# Patient Record
Sex: FEMALE | Employment: UNEMPLOYED | URBAN - METROPOLITAN AREA
[De-identification: names, ages, dates, MRNs, and addresses within clinical notes are randomized per-mention and may not be internally consistent; named-entity substitution may affect disease eponyms.]

---

## 2017-01-10 ENCOUNTER — ANESTHESIA EVENT (OUTPATIENT)
Dept: SURGERY | Age: 57
End: 2017-01-10
Payer: MEDICAID

## 2017-01-10 ENCOUNTER — HOSPITAL ENCOUNTER (OUTPATIENT)
Dept: LAB | Age: 57
Discharge: HOME | End: 2017-01-10
Payer: MEDICAID

## 2017-01-10 LAB
ANION GAP BLD CALC-SCNC: 5 MMOL/L (ref 3–18)
BUN SERPL-MCNC: 15 MG/DL (ref 7–18)
BUN/CREAT SERPL: 20 (ref 12–20)
CALCIUM SERPL-MCNC: 8.7 MG/DL (ref 8.5–10.1)
CHLORIDE SERPL-SCNC: 100 MMOL/L (ref 100–108)
CO2 SERPL-SCNC: 32 MMOL/L (ref 21–32)
CREAT SERPL-MCNC: 0.75 MG/DL (ref 0.6–1.3)
GLUCOSE SERPL-MCNC: 89 MG/DL (ref 74–99)
POTASSIUM SERPL-SCNC: 3.9 MMOL/L (ref 3.5–5.5)
SODIUM SERPL-SCNC: 137 MMOL/L (ref 136–145)

## 2017-01-10 PROCEDURE — 36415 COLL VENOUS BLD VENIPUNCTURE: CPT | Performed by: SURGERY

## 2017-01-10 PROCEDURE — 80048 BASIC METABOLIC PNL TOTAL CA: CPT | Performed by: SURGERY

## 2017-01-11 ENCOUNTER — ANESTHESIA (OUTPATIENT)
Dept: SURGERY | Age: 57
End: 2017-01-11
Payer: MEDICAID

## 2017-01-11 ENCOUNTER — HOME HEALTH ADMISSION (OUTPATIENT)
Dept: HOME HEALTH SERVICES | Facility: HOME HEALTH | Age: 57
End: 2017-01-11
Payer: MEDICAID

## 2017-01-11 PROCEDURE — 77030008683 HC TU ET CUF COVD -A: Performed by: ANESTHESIOLOGY

## 2017-01-11 PROCEDURE — 77030026438 HC STYL ET INTUB CARD -A: Performed by: ANESTHESIOLOGY

## 2017-01-11 PROCEDURE — 74011250636 HC RX REV CODE- 250/636

## 2017-01-11 PROCEDURE — 74011000250 HC RX REV CODE- 250

## 2017-01-11 PROCEDURE — 74011250636 HC RX REV CODE- 250/636: Performed by: SURGERY

## 2017-01-11 RX ORDER — SUCCINYLCHOLINE CHLORIDE 20 MG/ML
INJECTION INTRAMUSCULAR; INTRAVENOUS AS NEEDED
Status: DISCONTINUED | OUTPATIENT
Start: 2017-01-11 | End: 2017-01-11 | Stop reason: HOSPADM

## 2017-01-11 RX ORDER — PROPOFOL 10 MG/ML
INJECTION, EMULSION INTRAVENOUS AS NEEDED
Status: DISCONTINUED | OUTPATIENT
Start: 2017-01-11 | End: 2017-01-11 | Stop reason: HOSPADM

## 2017-01-11 RX ORDER — ONDANSETRON 2 MG/ML
INJECTION INTRAMUSCULAR; INTRAVENOUS AS NEEDED
Status: DISCONTINUED | OUTPATIENT
Start: 2017-01-11 | End: 2017-01-11 | Stop reason: HOSPADM

## 2017-01-11 RX ORDER — MIDAZOLAM HYDROCHLORIDE 1 MG/ML
INJECTION, SOLUTION INTRAMUSCULAR; INTRAVENOUS AS NEEDED
Status: DISCONTINUED | OUTPATIENT
Start: 2017-01-11 | End: 2017-01-11 | Stop reason: HOSPADM

## 2017-01-11 RX ORDER — FENTANYL CITRATE 50 UG/ML
INJECTION, SOLUTION INTRAMUSCULAR; INTRAVENOUS AS NEEDED
Status: DISCONTINUED | OUTPATIENT
Start: 2017-01-11 | End: 2017-01-11 | Stop reason: HOSPADM

## 2017-01-11 RX ORDER — LIDOCAINE HYDROCHLORIDE 20 MG/ML
INJECTION, SOLUTION EPIDURAL; INFILTRATION; INTRACAUDAL; PERINEURAL AS NEEDED
Status: DISCONTINUED | OUTPATIENT
Start: 2017-01-11 | End: 2017-01-11 | Stop reason: HOSPADM

## 2017-01-11 RX ADMIN — SUCCINYLCHOLINE CHLORIDE 140 MG: 20 INJECTION INTRAMUSCULAR; INTRAVENOUS at 07:45

## 2017-01-11 RX ADMIN — LIDOCAINE HYDROCHLORIDE 80 MG: 20 INJECTION, SOLUTION EPIDURAL; INFILTRATION; INTRACAUDAL; PERINEURAL at 07:45

## 2017-01-11 RX ADMIN — FENTANYL CITRATE 50 MCG: 50 INJECTION, SOLUTION INTRAMUSCULAR; INTRAVENOUS at 10:26

## 2017-01-11 RX ADMIN — MIDAZOLAM HYDROCHLORIDE 2 MG: 1 INJECTION, SOLUTION INTRAMUSCULAR; INTRAVENOUS at 07:39

## 2017-01-11 RX ADMIN — FENTANYL CITRATE 50 MCG: 50 INJECTION, SOLUTION INTRAMUSCULAR; INTRAVENOUS at 08:26

## 2017-01-11 RX ADMIN — PROPOFOL 40 MG: 10 INJECTION, EMULSION INTRAVENOUS at 08:11

## 2017-01-11 RX ADMIN — FENTANYL CITRATE 50 MCG: 50 INJECTION, SOLUTION INTRAMUSCULAR; INTRAVENOUS at 08:11

## 2017-01-11 RX ADMIN — PROPOFOL 160 MG: 10 INJECTION, EMULSION INTRAVENOUS at 07:45

## 2017-01-11 RX ADMIN — FENTANYL CITRATE 50 MCG: 50 INJECTION, SOLUTION INTRAMUSCULAR; INTRAVENOUS at 10:13

## 2017-01-11 RX ADMIN — FENTANYL CITRATE 100 MCG: 50 INJECTION, SOLUTION INTRAMUSCULAR; INTRAVENOUS at 07:41

## 2017-01-11 RX ADMIN — ONDANSETRON 4 MG: 2 INJECTION INTRAMUSCULAR; INTRAVENOUS at 07:40

## 2017-01-11 RX ADMIN — CEFAZOLIN SODIUM 2 G: 2 SOLUTION INTRAVENOUS at 07:41

## 2017-01-11 NOTE — ANESTHESIA PREPROCEDURE EVALUATION
Anesthetic History   No history of anesthetic complications            Review of Systems / Medical History  Patient summary reviewed and pertinent labs reviewed    Pulmonary            Asthma : well controlled       Neuro/Psych   Within defined limits           Cardiovascular    Hypertension: well controlled              Exercise tolerance: >4 METS  Comments: H/o ASD repair as a child   GI/Hepatic/Renal     GERD: well controlled           Endo/Other    Diabetes: type 2  Hypothyroidismhyperthyroidism  Obesity     Other Findings   Comments: Non smoker  Had flu shot           Physical Exam    Airway  Mallampati: III  TM Distance: 4 - 6 cm  Neck ROM: normal range of motion   Mouth opening: Normal     Cardiovascular  Regular rate and rhythm,  S1 and S2 normal,  no murmur, click, rub, or gallop             Dental  No notable dental hx       Pulmonary  Breath sounds clear to auscultation               Abdominal  GI exam deferred       Other Findings            Anesthetic Plan    ASA: 3  Anesthesia type: general      Post-op pain plan if not by surgeon: IV PCA    Induction: Intravenous  Anesthetic plan and risks discussed with: Patient

## 2017-01-11 NOTE — ANESTHESIA POSTPROCEDURE EVALUATION
Post-Anesthesia Evaluation and Assessment    Patient: Santana Diamond MRN: [id number]  SSN: [id number]    YOB: 1960  Age: 61 y.o. Sex: female       Cardiovascular Function/Vital Signs  Visit Vitals    /89 (BP 1 Location: Right arm, BP Patient Position: At rest)    Pulse 89    Temp 36.6 °C (97.8 °F)    Resp 20    Ht 5' 3\" (1.6 m)    Wt 77.6 kg (171 lb)    SpO2 100%    BMI 30.29 kg/m2       Patient is status post general anesthesia for Procedure(s):  PANNICULECTOMY. Nausea/Vomiting: None    Postoperative hydration reviewed and adequate. Pain:  Pain Scale 1: Numeric (0 - 10) (01/11/17 1343)  Pain Intensity 1: 0 (01/11/17 1343)   Managed    Neurological Status:   Neuro (WDL): Within Defined Limits (01/11/17 0658)   At baseline    Mental Status and Level of Consciousness: Arousable    Pulmonary Status:   O2 Device: Room air (01/11/17 1343)   Adequate oxygenation and airway patent    Complications related to anesthesia: None    Post-anesthesia assessment completed.  No concerns        Signed By: Kamar Paulson MD     January 11, 2017

## 2017-01-12 ENCOUNTER — HOME CARE VISIT (OUTPATIENT)
Dept: SCHEDULING | Facility: HOME HEALTH | Age: 57
End: 2017-01-12
Payer: MEDICAID

## 2017-01-12 VITALS
RESPIRATION RATE: 18 BRPM | HEART RATE: 69 BPM | OXYGEN SATURATION: 98 % | DIASTOLIC BLOOD PRESSURE: 86 MMHG | TEMPERATURE: 98.6 F | SYSTOLIC BLOOD PRESSURE: 150 MMHG

## 2017-01-12 PROCEDURE — A4452 WATERPROOF TAPE: HCPCS

## 2017-01-12 PROCEDURE — G0299 HHS/HOSPICE OF RN EA 15 MIN: HCPCS

## 2017-01-12 PROCEDURE — A6253 ABSORPT DRG > 48 SQ IN W/O B: HCPCS

## 2017-01-12 PROCEDURE — 400013 HH SOC

## 2017-01-12 PROCEDURE — A6252 ABSORPT DRG >16 <=48 W/O BDR: HCPCS

## 2017-01-13 ENCOUNTER — HOME CARE VISIT (OUTPATIENT)
Dept: SCHEDULING | Facility: HOME HEALTH | Age: 57
End: 2017-01-13
Payer: MEDICAID

## 2017-01-13 VITALS
SYSTOLIC BLOOD PRESSURE: 108 MMHG | OXYGEN SATURATION: 98 % | HEART RATE: 74 BPM | RESPIRATION RATE: 18 BRPM | DIASTOLIC BLOOD PRESSURE: 64 MMHG | TEMPERATURE: 98.6 F

## 2017-01-13 PROCEDURE — G0299 HHS/HOSPICE OF RN EA 15 MIN: HCPCS

## 2017-01-17 ENCOUNTER — HOME CARE VISIT (OUTPATIENT)
Dept: SCHEDULING | Facility: HOME HEALTH | Age: 57
End: 2017-01-17
Payer: MEDICAID

## 2017-01-17 VITALS
SYSTOLIC BLOOD PRESSURE: 110 MMHG | RESPIRATION RATE: 18 BRPM | DIASTOLIC BLOOD PRESSURE: 58 MMHG | TEMPERATURE: 98.2 F | OXYGEN SATURATION: 99 % | HEART RATE: 79 BPM

## 2017-01-17 PROCEDURE — G0299 HHS/HOSPICE OF RN EA 15 MIN: HCPCS

## 2017-01-20 ENCOUNTER — HOME CARE VISIT (OUTPATIENT)
Dept: SCHEDULING | Facility: HOME HEALTH | Age: 57
End: 2017-01-20
Payer: MEDICAID

## 2017-01-20 VITALS
TEMPERATURE: 98.2 F | HEART RATE: 71 BPM | OXYGEN SATURATION: 97 % | SYSTOLIC BLOOD PRESSURE: 130 MMHG | DIASTOLIC BLOOD PRESSURE: 72 MMHG | RESPIRATION RATE: 18 BRPM

## 2017-01-20 PROCEDURE — G0299 HHS/HOSPICE OF RN EA 15 MIN: HCPCS

## 2017-01-24 ENCOUNTER — HOME CARE VISIT (OUTPATIENT)
Dept: HOME HEALTH SERVICES | Facility: HOME HEALTH | Age: 57
End: 2017-01-24
Payer: MEDICAID

## 2017-01-25 ENCOUNTER — HOME CARE VISIT (OUTPATIENT)
Dept: SCHEDULING | Facility: HOME HEALTH | Age: 57
End: 2017-01-25
Payer: MEDICAID

## 2017-01-25 VITALS
SYSTOLIC BLOOD PRESSURE: 108 MMHG | HEART RATE: 66 BPM | DIASTOLIC BLOOD PRESSURE: 66 MMHG | RESPIRATION RATE: 18 BRPM | TEMPERATURE: 98.2 F | OXYGEN SATURATION: 98 %

## 2017-01-25 PROCEDURE — G0299 HHS/HOSPICE OF RN EA 15 MIN: HCPCS

## 2018-03-15 ENCOUNTER — OFFICE VISIT (OUTPATIENT)
Dept: FAMILY MEDICINE CLINIC | Age: 58
End: 2018-03-15

## 2018-03-15 VITALS
SYSTOLIC BLOOD PRESSURE: 132 MMHG | HEART RATE: 63 BPM | OXYGEN SATURATION: 100 % | TEMPERATURE: 98 F | RESPIRATION RATE: 14 BRPM | DIASTOLIC BLOOD PRESSURE: 74 MMHG | BODY MASS INDEX: 27.85 KG/M2 | HEIGHT: 63 IN | WEIGHT: 157.2 LBS

## 2018-03-15 DIAGNOSIS — M25.551 HIP PAIN, BILATERAL: ICD-10-CM

## 2018-03-15 DIAGNOSIS — J45.20 MILD INTERMITTENT ASTHMA, UNSPECIFIED WHETHER COMPLICATED: ICD-10-CM

## 2018-03-15 DIAGNOSIS — E03.9 HYPOTHYROIDISM, UNSPECIFIED TYPE: Chronic | ICD-10-CM

## 2018-03-15 DIAGNOSIS — M25.552 HIP PAIN, BILATERAL: ICD-10-CM

## 2018-03-15 DIAGNOSIS — I10 ESSENTIAL HYPERTENSION: Primary | ICD-10-CM

## 2018-03-15 DIAGNOSIS — Z98.84 S/P BARIATRIC SURGERY: ICD-10-CM

## 2018-03-15 DIAGNOSIS — E66.01 MORBID OBESITY (HCC): ICD-10-CM

## 2018-03-15 DIAGNOSIS — K59.00 CONSTIPATION, UNSPECIFIED CONSTIPATION TYPE: ICD-10-CM

## 2018-03-15 RX ORDER — FLUTICASONE PROPIONATE AND SALMETEROL 250; 50 UG/1; UG/1
1 POWDER RESPIRATORY (INHALATION) DAILY
COMMUNITY
Start: 2018-01-19 | End: 2018-03-15 | Stop reason: ALTCHOICE

## 2018-03-15 RX ORDER — ALBUTEROL SULFATE 90 UG/1
2 AEROSOL, METERED RESPIRATORY (INHALATION)
Qty: 1 INHALER | Refills: 1 | Status: SHIPPED | OUTPATIENT
Start: 2018-03-15 | End: 2018-05-03 | Stop reason: SDUPTHER

## 2018-03-15 RX ORDER — POLYETHYLENE GLYCOL 3350 17 G/17G
17 POWDER, FOR SOLUTION ORAL AS NEEDED
COMMUNITY
End: 2018-03-15

## 2018-03-15 RX ORDER — LISINOPRIL 40 MG/1
40 TABLET ORAL DAILY
Qty: 90 TAB | Refills: 1 | Status: SHIPPED | OUTPATIENT
Start: 2018-03-15 | End: 2018-06-15 | Stop reason: SDUPTHER

## 2018-03-15 RX ORDER — LEVOTHYROXINE SODIUM 25 UG/1
25 TABLET ORAL
Qty: 90 TAB | Refills: 0 | Status: SHIPPED | OUTPATIENT
Start: 2018-03-15 | End: 2018-06-15 | Stop reason: SDUPTHER

## 2018-03-15 RX ORDER — ALBUTEROL SULFATE 90 UG/1
2 AEROSOL, METERED RESPIRATORY (INHALATION) DAILY
COMMUNITY
Start: 2018-01-05 | End: 2018-03-15 | Stop reason: SDUPTHER

## 2018-03-15 RX ORDER — DICLOFENAC SODIUM 10 MG/G
GEL TOPICAL
COMMUNITY
Start: 2018-02-28 | End: 2018-03-15 | Stop reason: ALTCHOICE

## 2018-03-15 RX ORDER — MONTELUKAST SODIUM 10 MG/1
10 TABLET ORAL DAILY
Qty: 90 TAB | Refills: 0 | Status: SHIPPED | OUTPATIENT
Start: 2018-03-15 | End: 2018-06-15 | Stop reason: SDUPTHER

## 2018-03-15 NOTE — MR AVS SNAPSHOT
[address] [place] [address] 
[address] Christal Armendariz [address] 
[phone] Patient: Lupe Condon MRN: [id number] AUS:[birth year] Visit Information Date & Time Provider Department Dept. Phone Encounter #  
 3/15/2018 11:20 AM Amelie Jacobson NP [facility] [phone] [id number] Follow-up Instructions Return in about 3 months (around 6/15/2018) for Medication Check, Weight Mgmt, Hypertension, Hypothyroidism. Upcoming Health Maintenance Date Due Hepatitis C Screening 6/14/2018* Pneumococcal 19-64 Medium Risk (1 of 1 - PPSV23) 7/12/2018* BREAST CANCER SCRN MAMMOGRAM 7/13/2018* PAP AKA CERVICAL CYTOLOGY 7/18/2018* DTaP/Tdap/Td series (1 - Tdap) 7/26/2018* COLONOSCOPY 8/8/2018* *Topic was postponed. The date shown is not the original due date. Allergies as of 3/15/2018  Review Complete On: 3/15/2018 By: Amelie Jacobson NP Severity Noted Reaction Type Reactions Daypro [Oxaprozin]  05/07/2012    Swelling Current Immunizations  Never Reviewed No immunizations on file. Not reviewed this visit You Were Diagnosed With   
  
 Codes Comments Essential hypertension    -  Primary ICD-10-CM: I10 
ICD-9-CM: 401.9 Morbid obesity ([facility] [address]     ICD-10-CM: E66.01 
ICD-9-CM: 278.01 S/P bariatric surgery     ICD-10-CM: Z98.84 ICD-9-CM: V45.86 Hypothyroidism, unspecified type     ICD-10-CM: E03.9 ICD-9-CM: 244.9 Mild intermittent asthma, unspecified whether complicated     PYF-04-CC: J45.20 ICD-9-CM: 493.90 Constipation, unspecified constipation type     ICD-10-CM: K59.00 ICD-9-CM: 564.00 Hip pain, bilateral     ICD-10-CM: M25.551, M25.552 ICD-9-CM: 719.45 Vitals BP Pulse Temp Resp Height(growth percentile) Weight(growth percentile) 132/74 (BP 1 Location: Right arm, BP Patient Position: Sitting) 63 98 °F (36.7 °C) (Oral) 14 5' 3\" (1.6 m) 157 lb 3.2 oz (71.3 kg) SpO2 BMI OB Status Smoking Status 100% 27.85 kg/m2 Postmenopausal Never Smoker Vitals History BMI and BSA Data Body Mass Index Body Surface Area  
 27.85 kg/m 2 1.78 m 2 Preferred Pharmacy Pharmacy Name Phone [address] [phone] Your Updated Medication List  
  
   
This list is accurate as of 3/15/18  1:07 PM.  Always use your most recent med list.  
  
  
  
  
 acetaminophen 500 mg tablet Commonly known as:  TYLENOL Take 500 mg by mouth as needed for Pain. albuterol 90 mcg/actuation inhaler Commonly known as:  VENTOLIN HFA Take 2 Puffs by inhalation every four (4) hours as needed for Wheezing. amitriptyline 50 mg tablet Commonly known as:  ELAVIL Take 100 mg by mouth nightly. CALCIUM CITRATE PO Take 1 Tab by mouth two (2) times a day. levothyroxine 25 mcg tablet Commonly known as:  SYNTHROID Take 1 Tab by mouth Daily (before breakfast). linaclotide 145 mcg Cap capsule Commonly known as:  Rudy Mariano Take 1 Cap by mouth daily. lisinopril 40 mg tablet Commonly known as:  Carol Ann Doan Take 1 Tab by mouth daily. loratadine 10 mg tablet Commonly known as:  Sukarina Millers Take 10 mg by mouth daily. magnesium oxide 400 mg tablet Commonly known as:  MAG-OX Take 400 mg by mouth two (2) times a day. montelukast 10 mg tablet Commonly known as:  SINGULAIR Take 1 Tab by mouth daily. [address] Take 2 Gum by mouth two (2) times a day. Prescriptions Sent to Pharmacy Refills  
 albuterol (VENTOLIN HFA) 90 mcg/actuation inhaler 1 Sig: Take 2 Puffs by inhalation every four (4) hours as needed for Wheezing. Class: Normal  
 [facility] [address] [facility] [address] Ph #: [phone]  Route: Inhalation  
 levothyroxine (SYNTHROID) 25 mcg tablet 0  
 Sig: Take 1 Tab by mouth Daily (before breakfast). Class: Normal  
 Pharmacy: [address] Ph #: [phone] Route: Oral  
 lisinopril (PRINIVIL, ZESTRIL) 40 mg tablet 1 Sig: Take 1 Tab by mouth daily. Class: Normal  
 Pharmacy: [address] Ph #: [phone] Route: Oral  
 montelukast (SINGULAIR) 10 mg tablet 0 Sig: Take 1 Tab by mouth daily. Class: Normal  
 Pharmacy: [address] Ph #: [phone] Route: Oral  
 linaclotide (LINZESS) 145 mcg cap capsule 0 Sig: Take 1 Cap by mouth daily. Class: Normal  
 Pharmacy: [address] Ph #: [phone] Route: Oral  
  
Follow-up Instructions Return in about 3 months (around 6/15/2018) for Medication Check, Weight Mgmt, Hypertension, Hypothyroidism. Patient Instructions To have your blood type checked, you can: 
1) donate blood to a group like the [employer] or [employer] 2) Go to [employer] and pay out of pocket. You would have to come to our office,  the paper order for the blood type, take ID and order to [facility], pay at the time your blood is drawn. It is estimated to be $87.00 Constipation: Care Instructions Your Care Instructions Constipation means that you have a hard time passing stools (bowel movements). People pass stools from 3 times a day to once every 3 days. What is normal for you may be different. Constipation may occur with pain in the rectum and cramping. The pain may get worse when you try to pass stools. Sometimes there are small amounts of bright red blood on toilet paper or the surface of stools. This is because of enlarged veins near the rectum (hemorrhoids).  
A few changes in your diet and lifestyle may help you avoid ongoing constipation. Your doctor may also prescribe medicine to help loosen your stool. Some medicines can cause constipation. These include pain medicines and antidepressants. Tell your doctor about all the medicines you take. Your doctor may want to make a medicine change to ease your symptoms. Follow-up care is a key part of your treatment and safety. Be sure to make and go to all appointments, and call your doctor if you are having problems. It's also a good idea to know your test results and keep a list of the medicines you take. How can you care for yourself at home? · Drink plenty of fluids, enough so that your urine is light yellow or clear like water. If you have kidney, heart, or liver disease and have to limit fluids, talk with your doctor before you increase the amount of fluids you drink. · Include high-fiber foods in your diet each day. These include fruits, vegetables, beans, and whole grains. · Get at least 30 minutes of exercise on most days of the week. Walking is a good choice. You also may want to do other activities, such as running, swimming, cycling, or playing tennis or team sports. · Take a fiber supplement, such as Citrucel or Metamucil, every day. Read and follow all instructions on the label. · Schedule time each day for a bowel movement. A daily routine may help. Take your time having your bowel movement. · Support your feet with a small step stool when you sit on the toilet. This helps flex your hips and places your pelvis in a squatting position. · Your doctor may recommend an over-the-counter laxative to relieve your constipation. Examples are Milk of Magnesia and MiraLax. Read and follow all instructions on the label. Do not use laxatives on a long-term basis. When should you call for help? Call your doctor now or seek immediate medical care if: 
? · You have new or worse belly pain. ? · You have new or worse nausea or vomiting. ? · You have blood in your stools. ?Watch closely for changes in your health, and be sure to contact your doctor if: 
? · Your constipation is getting worse. ? · You do not get better as expected. Where can you learn more? Go to http://federico-suah.info/. Enter 21 [phone] in the search box to learn more about \"Constipation: Care Instructions. \" Current as of: March 20, 2017 Content Version: 11.4 © 0149-4542 [employer]. Care instructions adapted under license by [employer] (which disclaims liability or warranty for this information). If you have questions about a medical condition or this instruction, always ask your healthcare professional. [place] [address] any warranty or liability for your use of this information. Hip Pain: Care Instructions Your Care Instructions Hip pain may be caused by many things, including overuse, a fall, or a twisting movement. Another cause of hip pain is arthritis. Your pain may increase when you stand up, walk, or squat. The pain may come and go or may be constant. Home treatment can help relieve hip pain, swelling, and stiffness. If your pain is ongoing, you may need more tests and treatment. Follow-up care is a key part of your treatment and safety. Be sure to make and go to all appointments, and call your doctor if you are having problems. It's also a good idea to know your test results and keep a list of the medicines you take. How can you care for yourself at home? · Take pain medicines exactly as directed. ¨ If the doctor gave you a prescription medicine for pain, take it as prescribed. ¨ If you are not taking a prescription pain medicine, ask your doctor if you can take an over-the-counter medicine. · Rest and protect your hip. Take a break from any activity, including standing or walking, that may cause pain. · Put ice or a cold pack against your hip for 10 to 20 minutes at a time. Try to do this every 1 to 2 hours for the next 3 days (when you are awake) or until the swelling goes down. Put a thin cloth between the ice and your skin. · Sleep on your healthy side with a pillow between your knees, or sleep on your back with pillows under your knees. · If there is no swelling, you can put moist heat, a heating pad, or a warm cloth on your hip. Do gentle stretching exercises to help keep your hip flexible. · Learn how to prevent falls. Have your vision and hearing checked regularly. Wear slippers or shoes with a nonskid sole. · Stay at a healthy weight. · Wear comfortable shoes. When should you call for help? Call 911 anytime you think you may need emergency care. For example, call if: 
? · You have sudden chest pain and shortness of breath, or you cough up blood. ? · You are not able to stand or walk or bear weight. ? · Your buttocks, legs, or feet feel numb or tingly. ? · Your leg or foot is cool or pale or changes color. ? · You have severe pain. ?Call your doctor now or seek immediate medical care if: 
? · You have signs of infection, such as: 
¨ Increased pain, swelling, warmth, or redness in the hip area. ¨ Red streaks leading from the hip area. ¨ Pus draining from the hip area. ¨ A fever. ? · You have signs of a blood clot, such as: 
¨ Pain in your calf, back of the knee, thigh, or groin. ¨ Redness and swelling in your leg or groin. ? · You are not able to bend, straighten, or move your leg normally. ? · You have trouble urinating or having bowel movements. ? Watch closely for changes in your health, and be sure to contact your doctor if: 
? · You do not get better as expected. Where can you learn more? Go to http://federico-suha.info/. Enter N868 in the search box to learn more about \"Hip Pain: Care Instructions. \" Current as of: March 20, 2017 Content Version: 11.4 © 7723-8220 [employer].  Care instructions adapted under license by [address] (which disclaims liability or warranty for this information). If you have questions about a medical condition or this instruction, always ask your healthcare professional. [address] any warranty or liability for your use of this information. Introducing [facility] & HEALTH SERVICES! [facility] introduces DeRev patient portal. Now you can access parts of your medical record, email your doctor's office, and request medication refills online. 1. In your internet browser, go to [email or web address] 2. Click on the First Time User? Click Here link in the Sign In box. You will see the New Member Sign Up page. 3. Enter your DeRev Access Code exactly as it appears below. You will not need to use this code after youve completed the sign-up process. If you do not sign up before the expiration date, you must request a new code. · DeRev Access Code: [email or web address] Expires: 6/13/2018 10:56 AM 
 
4. Enter the last four digits of your Social Security Number (xxxx) and Date of Birth (mm/dd/yyyy) as indicated and click Submit. You will be taken to the next sign-up page. 5. Create a DeRev ID. This will be your DeRev login ID and cannot be changed, so think of one that is secure and easy to remember. 6. Create a DeRev password. You can change your password at any time. 7. Enter your Password Reset Question and Answer. This can be used at a later time if you forget your password. 8. Enter your e-mail address. You will receive e-mail notification when new information is available in [address] 9. Click Sign Up. You can now view and download portions of your medical record. 10. Click the Download Summary menu link to download a portable copy of your medical information. If you have questions, please visit the Frequently Asked Questions section of the DeRev website.  Remember, DeRev is NOT to be used for urgent needs. For medical emergencies, dial 911. Now available from your iPhone and Android! Please provide this summary of care documentation to your next provider. Your primary care clinician is listed as Tamanna Dawson. If you have any questions after today's visit, please call [phone].

## 2018-03-15 NOTE — PROGRESS NOTES
Jarrod Parsons is a 60 y.o. female      Chief Complaint   Patient presents with    New Patient     Establish Care    Hip Pain     Pt stated that she would like to talk about getting steroid shots. 1. Have you been to the ER, urgent care clinic since your last visit? Hospitalized since your last visit? No    2. Have you seen or consulted any other health care providers outside of the [address] [place] [address] since your last visit? Include any pap smears or colon screening.  No

## 2018-03-15 NOTE — PATIENT INSTRUCTIONS
To have your blood type checked, you can:  1) donate blood to a group like the [employer] or [employer] [facility]    2) Go to [facility] and pay out of pocket. You would have to come to our office,  the paper order for the blood type, take ID and order to [facility], pay at the time your blood is drawn. It is estimated to be $87.00     Constipation: Care Instructions  Your Care Instructions    Constipation means that you have a hard time passing stools (bowel movements). People pass stools from 3 times a day to once every 3 days. What is normal for you may be different. Constipation may occur with pain in the rectum and cramping. The pain may get worse when you try to pass stools. Sometimes there are small amounts of bright red blood on toilet paper or the surface of stools. This is because of enlarged veins near the rectum (hemorrhoids). A few changes in your diet and lifestyle may help you avoid ongoing constipation. Your doctor may also prescribe medicine to help loosen your stool. Some medicines can cause constipation. These include pain medicines and antidepressants. Tell your doctor about all the medicines you take. Your doctor may want to make a medicine change to ease your symptoms. Follow-up care is a key part of your treatment and safety. Be sure to make and go to all appointments, and call your doctor if you are having problems. It's also a good idea to know your test results and keep a list of the medicines you take. How can you care for yourself at home? · Drink plenty of fluids, enough so that your urine is light yellow or clear like water. If you have kidney, heart, or liver disease and have to limit fluids, talk with your doctor before you increase the amount of fluids you drink. · Include high-fiber foods in your diet each day. These include fruits, vegetables, beans, and whole grains. · Get at least 30 minutes of exercise on most days of the week. Walking is a good choice.  You also may want to do other activities, such as running, swimming, cycling, or playing tennis or team sports. · Take a fiber supplement, such as Citrucel or Metamucil, every day. Read and follow all instructions on the label. · Schedule time each day for a bowel movement. A daily routine may help. Take your time having your bowel movement. · Support your feet with a small step stool when you sit on the toilet. This helps flex your hips and places your pelvis in a squatting position. · Your doctor may recommend an over-the-counter laxative to relieve your constipation. Examples are Milk of Magnesia and MiraLax. Read and follow all instructions on the label. Do not use laxatives on a long-term basis. When should you call for help? Call your doctor now or seek immediate medical care if:  ? · You have new or worse belly pain. ? · You have new or worse nausea or vomiting. ? · You have blood in your stools. ? Watch closely for changes in your health, and be sure to contact your doctor if:  ? · Your constipation is getting worse. ? · You do not get better as expected. Where can you learn more? Go to http://federico-suha.info/. Enter 21  in the search box to learn more about \"Constipation: Care Instructions. \"  Current as of: March 20, 2017  Content Version: 11.4  © 8368-2903 [employer]. Care instructions adapted under license by [employer] (which disclaims liability or warranty for this information). If you have questions about a medical condition or this instruction, always ask your healthcare professional. [place] [address] any warranty or liability for your use of this information. Hip Pain: Care Instructions  Your Care Instructions    Hip pain may be caused by many things, including overuse, a fall, or a twisting movement. Another cause of hip pain is arthritis. Your pain may increase when you stand up, walk, or squat.  The pain may come and go or may be constant. Home treatment can help relieve hip pain, swelling, and stiffness. If your pain is ongoing, you may need more tests and treatment. Follow-up care is a key part of your treatment and safety. Be sure to make and go to all appointments, and call your doctor if you are having problems. It's also a good idea to know your test results and keep a list of the medicines you take. How can you care for yourself at home? · Take pain medicines exactly as directed. ¨ If the doctor gave you a prescription medicine for pain, take it as prescribed. ¨ If you are not taking a prescription pain medicine, ask your doctor if you can take an over-the-counter medicine. · Rest and protect your hip. Take a break from any activity, including standing or walking, that may cause pain. · Put ice or a cold pack against your hip for 10 to 20 minutes at a time. Try to do this every 1 to 2 hours for the next 3 days (when you are awake) or until the swelling goes down. Put a thin cloth between the ice and your skin. · Sleep on your healthy side with a pillow between your knees, or sleep on your back with pillows under your knees. · If there is no swelling, you can put moist heat, a heating pad, or a warm cloth on your hip. Do gentle stretching exercises to help keep your hip flexible. · Learn how to prevent falls. Have your vision and hearing checked regularly. Wear slippers or shoes with a nonskid sole. · Stay at a healthy weight. · Wear comfortable shoes. When should you call for help? Call 911 anytime you think you may need emergency care. For example, call if:  ? · You have sudden chest pain and shortness of breath, or you cough up blood. ? · You are not able to stand or walk or bear weight. ? · Your buttocks, legs, or feet feel numb or tingly. ? · Your leg or foot is cool or pale or changes color. ? · You have severe pain.    ?Call your doctor now or seek immediate medical care if:  ? · You have signs of infection, such as:  ¨ Increased pain, swelling, warmth, or redness in the hip area. ¨ Red streaks leading from the hip area. ¨ Pus draining from the hip area. ¨ A fever. ? · You have signs of a blood clot, such as:  ¨ Pain in your calf, back of the knee, thigh, or groin. ¨ Redness and swelling in your leg or groin. ? · You are not able to bend, straighten, or move your leg normally. ? · You have trouble urinating or having bowel movements. ? Watch closely for changes in your health, and be sure to contact your doctor if:  ? · You do not get better as expected. Where can you learn more? Go to http://federico-suha.info/. Enter N168 in the search box to learn more about \"Hip Pain: Care Instructions. \"  Current as of: March 20, 2017  Content Version: 11.4  © 8956-6156 [employer]. Care instructions adapted under license by [employer] (which disclaims liability or warranty for this information). If you have questions about a medical condition or this instruction, always ask your healthcare professional. [place] [address] any warranty or liability for your use of this information.

## 2018-03-15 NOTE — PROGRESS NOTES
Chief Complaint   Patient presents with    New Patient     Establish Care    Hip Pain     Pt stated that she would like to talk about getting steroid shots. HPI:  Mitchel Callahan is a 60y.o. year old female who is a new patient and is here to establish care. She was previously followed by Dr. Jasper Rey until she left the practice, [facility]. She is also a former patient of mine at [facility]. Agree with nurse note. Hip Pain: Pt reports she is scheduled for rotator cuff repair on April 3, 2018. She has bilateral hip pain and used to get steroid injections in her hips w/ Dr. Eder Watson.  Today she reports her hip pain bilaterally is about a 5/10 which is much better. She would like to have steroid injections prior to her surgery. Weight Mgmt:  She is s/p laparoscopic GBP in 2014 with Dr. Fernandez Gong. Pt is here today in f/u for medication management for weight loss. Her preoperative weight was 289 pounds. Her HW was 313 pounds. The patient's goal weight is 140 pounds. Her weight today is 157 pounds with a BMI of 27.85. She is unsure how much weight she has lost since she saw me last in summer of 2017 at [facility]. She used to be on Contrave for approximately 3 months. She denies adverse effects, she felt like it was somewhat effective. Diet:  The patient has made the following diet modifications: she is doing very well with her diet protocol. Pt is tolerating solids well and is getting in adequate protein. Pt is measuring meals with \"eyeballing\". Hydration: Pt is getting in adequate fluids. Pt is not drinking with meals. Pt is not drinking liquid calories. The patient is drinking the following: water, The patient is getting in at least 64+ oz of fluids daily. Exercise: Pt is using the treadmill, walking/jogging at least 3x/week         Weights: The patient is weighing daily         Vitamins: Pt is taking the recommended postoperative nutritional supplements. Associated Symptoms: Pt denies N/V, GERD, night time regurgitation and episodes of obstruction. Pt denies abdominal pain. Pt c/o constipation despite taking Miralax. Pt denies diarrhea. The following sections were reviewed & updated as appropriate: PMH, PSH, PL, FMH,  and SH. 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    HX CHOLECYSTECTOMY  2007    HX CYST INCISION AND DRAINAGE  2007    HX GASTRIC BYPASS  2014    [facility]    C/S    HX ORTHOPAEDIC  2011    carpal tunnel right hand, with trigger finger release    MT CLOSED RX TOE FX  1984    pins placed       Patient Active Problem List   Diagnosis Code    HTN (hypertension) I10    Asthma J45.909    S/P bariatric surgery Z98.84    Hypothyroid E03.9    Hip pain, bilateral M25.551, M25.552    Constipation K59.00        Family History   Problem Relation Age of Onset    Diabetes Father     Asthma Father     Hypertension Father     Hypertension Sister     Hypertension Brother        Social History     Social History    Marital status: UNKNOWN     Spouse name: N/A    Number of children: N/A    Years of education: N/A     Occupational History    Not on file. Social History Main Topics    Smoking status: Never Smoker    Smokeless tobacco: Never Used    Alcohol use No    Drug use: No    Sexual activity: Not on file     Other Topics Concern    Not on file     Social History Narrative       Prior to Admission medications    Medication Sig Start Date End Date Taking?  Authorizing Provider   albuterol (VENTOLIN HFA) 90 mcg/actuation inhaler Take 2 Puffs by inhalation every four (4) hours as needed for Wheezing. 3/15/18  Yes Edwin Celestin NP   levothyroxine (SYNTHROID) 25 mcg tablet Take 1 Tab by mouth Daily (before breakfast). 3/15/18  Yes Edwin Celestin NP   lisinopril (PRINIVIL, ZESTRIL) 40 mg tablet Take 1 Tab by mouth daily. 3/15/18  Yes Edwin Celestin NP   montelukast (SINGULAIR) 10 mg tablet Take 1 Tab by mouth daily. 3/15/18  Yes Edwin Celestin NP   linaclotide Cheral Steve) 145 mcg cap capsule Take 1 Cap by mouth daily. 3/15/18  Yes Edwin Celestin NP   MULTIVIT-MINERALS/FOLIC ACID (WOMENS DAILY GUMMIES PO) Take 2 Gum by mouth two (2) times a day. 1/12/17  Yes Historical Provider   CALCIUM CITRATE PO Take 1 Tab by mouth two (2) times a day. 1/12/17  Yes Historical Provider   acetaminophen (TYLENOL) 500 mg tablet Take 500 mg by mouth as needed for Pain. 1/12/17  Yes Historical Provider   magnesium oxide (MAG-OX) 400 mg tablet Take 400 mg by mouth two (2) times a day. 1/12/17  Yes Historical Provider   loratadine (CLARITIN) 10 mg tablet Take 10 mg by mouth daily. Yes Historical Provider   amitriptyline (ELAVIL) 50 mg tablet Take 100 mg by mouth nightly. Yes Historical Provider        Allergies   Allergen Reactions    Daypro [Oxaprozin] Swelling          Review of Systems  A comprehensive review of systems was negative except for that written in the HPI.     Objective:       Visit Vitals    /74 (BP 1 Location: Right arm, BP Patient Position: Sitting)    Pulse 63    Temp 98 °F (36.7 °C) (Oral)    Resp 14    Ht 5' 3\" (1.6 m)    Wt 157 lb 3.2 oz (71.3 kg)    SpO2 100%    BMI 27.85 kg/m2       Physical Exam  Gen: alert, oriented, no acute distress  Head: normocephalic, atraumatic  Ears: external auditory canals clear, TMs without erythema or effusion  Eyes: pupils equal round reactive to light, sclera clear, conjunctiva clear  Oral: moist mucus membranes, no oral lesions, no pharyngeal inflammation or exudate  Neck: symmetric normal sized thyroid, no carotid bruits, no jugular vein distention  Resp: no increase work of breathing, lungs clear to ausculation bilaterally, no wheezing, rales or rhonchi  CV: S1, S2 normal.  No murmurs, rubs, or gallops. Abd: soft, not tender, not distended. No hepatosplenomegaly. Normal bowel sounds. No hernias. No abdominal or renal bruits. Neuro: cranial nerves intact, normal strength and movement in all extremities, reflexes and sensation intact and symmetric. Skin: no lesion or rash  Extremities: no cyanosis or edema    Assessment & Plan:    ICD-10-CM ICD-9-CM    1. Essential hypertension I10 401.9 lisinopril (PRINIVIL, ZESTRIL) 40 mg tablet   2. Morbid obesity (HCC) E66.01 278.01 albuterol (VENTOLIN HFA) 90 mcg/actuation inhaler      levothyroxine (SYNTHROID) 25 mcg tablet      lisinopril (PRINIVIL, ZESTRIL) 40 mg tablet      montelukast (SINGULAIR) 10 mg tablet   3. S/P bariatric surgery Z98.84 V45.86 albuterol (VENTOLIN HFA) 90 mcg/actuation inhaler      levothyroxine (SYNTHROID) 25 mcg tablet      lisinopril (PRINIVIL, ZESTRIL) 40 mg tablet      montelukast (SINGULAIR) 10 mg tablet   4. Hypothyroidism, unspecified type E03.9 244.9 levothyroxine (SYNTHROID) 25 mcg tablet   5. Mild intermittent asthma, unspecified whether complicated D55.22 285.98 albuterol (VENTOLIN HFA) 90 mcg/actuation inhaler      montelukast (SINGULAIR) 10 mg tablet   6. Constipation, unspecified constipation type K59.00 564.00 linaclotide (LINZESS) 145 mcg cap capsule   7. Hip pain, bilateral M25.551 719.45     M25.552       Follow-up Disposition:  Return in about 3 months (around 6/15/2018) for Medication Check, Weight Mgmt, Hypertension, Hypothyroidism. lab results and schedule of future lab studies reviewed with patient, will review records and do necessary labs at her next visit.   reviewed diet, exercise and weight control, will consider appetite suppressant once she has recovered from her rotator cuff repair surgery  cardiovascular risk and specific lipid/LDL goals reviewed  reviewed medications and side effects in detail  Pt to return in approximately 1 week for bilateral hip injections    Differential diagnosis and treatment options reviewed with patient who is in agreement with treatment plan as outlined below. Health Maintenance reviewed - reviewed, awaiting records for updates and outstanding HM items. Recommended healthy diet low in carbohydrates, fats, sodium and cholesterol. Recommended regular cardiovascular exercise 3-6 times per week for 30-60 minutes daily. Chart is reviewed and updated today in the office. Records requested for other providers patient has seen and is currently seeing. Patient was offered a choice/choices in the treatment plan today. Patient expresses understanding of the plan and agrees with recommendations. Verbal and written instructions (see AVS) provided. See patient instructions for more. Patient expresses understanding of diagnosis and treatment plan.

## 2018-03-27 ENCOUNTER — TELEPHONE (OUTPATIENT)
Dept: FAMILY MEDICINE CLINIC | Age: 58
End: 2018-03-27

## 2018-04-12 NOTE — TELEPHONE ENCOUNTER
PCP: Ping Lind NP    Last appt: 3/15/2018  Future Appointments  Date Time Provider Jammie Sanchezi   6/15/2018 8:00 AM Ping Lind NP [facility]       Requested Prescriptions     Pending Prescriptions Disp Refills    loratadine (CLARITIN) 10 mg tablet 30 Tab 2     Sig: Take 1 Tab by mouth daily.      Lab Results   Component Value Date/Time    Sodium 137 01/10/2017 12:10 PM    Potassium 3.9 01/10/2017 12:10 PM    Chloride 100 01/10/2017 12:10 PM    CO2 32 01/10/2017 12:10 PM    Anion gap 5 01/10/2017 12:10 PM    Glucose 89 01/10/2017 12:10 PM    BUN 15 01/10/2017 12:10 PM    Creatinine 0.75 01/10/2017 12:10 PM    BUN/Creatinine ratio 20 01/10/2017 12:10 PM    GFR est AA >60 01/10/2017 12:10 PM    GFR est non-AA >60 01/10/2017 12:10 PM    Calcium 8.7 01/10/2017 12:10 PM     No results found for: HBA1C, HGBE8, [id number], UMG4IJDS  No results found for: CHOL, CHOLPOCT, CHOLX, CHLST, CHOLV, HDL, HDLPOC, LDL, LDLCPOC, LDLC, DLDLP, VLDLC, VLDL, TGLX, TRIGL, TRIGP, TGLPOCT, CHHD, CHHDX  No results found for: TSH, TSH2, TSH3, TSHP, TSHEXT

## 2018-04-13 RX ORDER — LORATADINE 10 MG/1
10 TABLET ORAL DAILY
Qty: 30 TAB | Refills: 2 | Status: SHIPPED | OUTPATIENT
Start: 2018-04-13 | End: 2018-06-15 | Stop reason: SDUPTHER

## 2018-04-26 DIAGNOSIS — K59.00 CONSTIPATION, UNSPECIFIED CONSTIPATION TYPE: ICD-10-CM

## 2018-04-26 NOTE — TELEPHONE ENCOUNTER
PCP: Tameka Lombardo NP    Last appt: 3/15/2018  Future Appointments  Date Time Provider Jammie House   6/15/2018 8:00 AM Tameka Lombardo NP [facility]       Requested Prescriptions     Pending Prescriptions Disp Refills    linaclotide (LINZESS) 145 mcg cap capsule 30 Cap 2     Sig: Take 1 Cap by mouth daily.      Lab Results   Component Value Date/Time    Sodium 137 01/10/2017 12:10 PM    Potassium 3.9 01/10/2017 12:10 PM    Chloride 100 01/10/2017 12:10 PM    CO2 32 01/10/2017 12:10 PM    Anion gap 5 01/10/2017 12:10 PM    Glucose 89 01/10/2017 12:10 PM    BUN 15 01/10/2017 12:10 PM    Creatinine 0.75 01/10/2017 12:10 PM    BUN/Creatinine ratio 20 01/10/2017 12:10 PM    GFR est AA >60 01/10/2017 12:10 PM    GFR est non-AA >60 01/10/2017 12:10 PM    Calcium 8.7 01/10/2017 12:10 PM     No results found for: HBA1C, HGBE8, [id number], [id number]  No results found for: CHOL, CHOLPOCT, CHOLX, CHLST, CHOLV, HDL, HDLPOC, LDL, LDLCPOC, LDLC, DLDLP, VLDLC, VLDL, TGLX, TRIGL, TRIGP, TGLPOCT, CHHD, CHHDX  No results found for: TSH, TSH2, TSH3, TSHP, TSHEXT

## 2018-04-26 NOTE — TELEPHONE ENCOUNTER
----- Message from Christofer Wei sent at 4/26/2018  9:03 AM EDT -----  Regarding: Dr. [place] Refill  Pt is calling for a refill on \"Linzess\" to be sent to [facility] DR PETE KELLER. Best contact: [phone].

## 2018-05-02 DIAGNOSIS — K59.00 CONSTIPATION, UNSPECIFIED CONSTIPATION TYPE: ICD-10-CM

## 2018-05-02 NOTE — TELEPHONE ENCOUNTER
PCP: Urbano Ortega NP    Last appt: 3/15/2018  Future Appointments  Date Time Provider Jammie House   6/15/2018 8:00 AM Urbano Ortega NP [facility]       Requested Prescriptions     Pending Prescriptions Disp Refills    linaclotide (LINZESS) 145 mcg cap capsule 30 Cap 2     Sig: Take 1 Cap by mouth daily.      Lab Results   Component Value Date/Time    Sodium 137 01/10/2017 12:10 PM    Potassium 3.9 01/10/2017 12:10 PM    Chloride 100 01/10/2017 12:10 PM    CO2 32 01/10/2017 12:10 PM    Anion gap 5 01/10/2017 12:10 PM    Glucose 89 01/10/2017 12:10 PM    BUN 15 01/10/2017 12:10 PM    Creatinine 0.75 01/10/2017 12:10 PM    BUN/Creatinine ratio 20 01/10/2017 12:10 PM    GFR est AA >60 01/10/2017 12:10 PM    GFR est non-AA >60 01/10/2017 12:10 PM    Calcium 8.7 01/10/2017 12:10 PM     No results found for: HBA1C, HGBE8, RYY3LOCN, [id number]  No results found for: CHOL, CHOLPOCT, CHOLX, CHLST, CHOLV, HDL, HDLPOC, LDL, LDLCPOC, LDLC, DLDLP, VLDLC, VLDL, TGLX, TRIGL, TRIGP, TGLPOCT, CHHD, CHHDX  No results found for: TSH, TSH2, TSH3, TSHP, TSHEXT

## 2018-05-02 NOTE — TELEPHONE ENCOUNTER
----- Message from Terese Carlson sent at 5/2/2018  2:31 PM EDT -----  Regarding: LITZY Argueta/Refgraciela  Pt requesting a refill on Linzess 145mg. [address].  Best contact [phone]

## 2018-05-03 DIAGNOSIS — Z98.84 S/P BARIATRIC SURGERY: ICD-10-CM

## 2018-05-03 DIAGNOSIS — J45.20 MILD INTERMITTENT ASTHMA, UNSPECIFIED WHETHER COMPLICATED: ICD-10-CM

## 2018-05-03 DIAGNOSIS — E66.01 MORBID OBESITY (HCC): ICD-10-CM

## 2018-05-03 NOTE — TELEPHONE ENCOUNTER
Requested Prescriptions     Pending Prescriptions Disp Refills    albuterol (VENTOLIN HFA) 90 mcg/actuation inhaler 1 Inhaler 1     Sig: Take 2 Puffs by inhalation every four (4) hours as needed for Wheezing.

## 2018-05-04 NOTE — TELEPHONE ENCOUNTER
PCP: Diane Macedo NP    Last appt: 3/15/2018  Future Appointments  Date Time Provider Jammie Sanchezi   6/15/2018 8:00 AM Diane Macedo NP [facility]       Requested Prescriptions     Pending Prescriptions Disp Refills    albuterol (VENTOLIN HFA) 90 mcg/actuation inhaler 1 Inhaler 1     Sig: Take 2 Puffs by inhalation every four (4) hours as needed for Wheezing.      Lab Results   Component Value Date/Time    Sodium 137 01/10/2017 12:10 PM    Potassium 3.9 01/10/2017 12:10 PM    Chloride 100 01/10/2017 12:10 PM    CO2 32 01/10/2017 12:10 PM    Anion gap 5 01/10/2017 12:10 PM    Glucose 89 01/10/2017 12:10 PM    BUN 15 01/10/2017 12:10 PM    Creatinine 0.75 01/10/2017 12:10 PM    BUN/Creatinine ratio 20 01/10/2017 12:10 PM    GFR est AA >60 01/10/2017 12:10 PM    GFR est non-AA >60 01/10/2017 12:10 PM    Calcium 8.7 01/10/2017 12:10 PM     No results found for: HBA1C, HGBE8, [id number], [id number]  No results found for: CHOL, CHOLPOCT, CHOLX, CHLST, CHOLV, HDL, HDLPOC, LDL, LDLCPOC, LDLC, DLDLP, VLDLC, VLDL, TGLX, TRIGL, TRIGP, TGLPOCT, CHHD, CHHDX  No results found for: TSH, TSH2, TSH3, TSHP, TSHEXT

## 2018-05-08 RX ORDER — ALBUTEROL SULFATE 90 UG/1
2 AEROSOL, METERED RESPIRATORY (INHALATION)
Qty: 1 INHALER | Refills: 3 | Status: SHIPPED | OUTPATIENT
Start: 2018-05-08 | End: 2018-06-15 | Stop reason: SDUPTHER

## 2018-06-15 ENCOUNTER — OFFICE VISIT (OUTPATIENT)
Dept: FAMILY MEDICINE CLINIC | Age: 58
End: 2018-06-15

## 2018-06-15 VITALS
OXYGEN SATURATION: 98 % | TEMPERATURE: 97.9 F | RESPIRATION RATE: 18 BRPM | BODY MASS INDEX: 28.81 KG/M2 | DIASTOLIC BLOOD PRESSURE: 84 MMHG | SYSTOLIC BLOOD PRESSURE: 146 MMHG | HEART RATE: 71 BPM | HEIGHT: 63 IN | WEIGHT: 162.6 LBS

## 2018-06-15 DIAGNOSIS — E03.9 HYPOTHYROIDISM, UNSPECIFIED TYPE: Chronic | ICD-10-CM

## 2018-06-15 DIAGNOSIS — R53.83 FATIGUE, UNSPECIFIED TYPE: ICD-10-CM

## 2018-06-15 DIAGNOSIS — M25.551 HIP PAIN, BILATERAL: Primary | ICD-10-CM

## 2018-06-15 DIAGNOSIS — Z98.84 BARIATRIC SURGERY STATUS: ICD-10-CM

## 2018-06-15 DIAGNOSIS — E44.1 MILD PROTEIN-CALORIE MALNUTRITION (HCC): ICD-10-CM

## 2018-06-15 DIAGNOSIS — Z11.59 ENCOUNTER FOR HEPATITIS C SCREENING TEST FOR LOW RISK PATIENT: ICD-10-CM

## 2018-06-15 DIAGNOSIS — M25.552 HIP PAIN, BILATERAL: Primary | ICD-10-CM

## 2018-06-15 DIAGNOSIS — D64.9 ANEMIA, UNSPECIFIED TYPE: ICD-10-CM

## 2018-06-15 DIAGNOSIS — I10 ESSENTIAL HYPERTENSION: ICD-10-CM

## 2018-06-15 DIAGNOSIS — Z98.84 S/P BARIATRIC SURGERY: ICD-10-CM

## 2018-06-15 DIAGNOSIS — K59.00 CONSTIPATION, UNSPECIFIED CONSTIPATION TYPE: ICD-10-CM

## 2018-06-15 DIAGNOSIS — J45.20 MILD INTERMITTENT ASTHMA, UNSPECIFIED WHETHER COMPLICATED: ICD-10-CM

## 2018-06-15 DIAGNOSIS — Z13.220 LIPID SCREENING: ICD-10-CM

## 2018-06-15 DIAGNOSIS — E66.3 OVERWEIGHT (BMI 25.0-29.9): ICD-10-CM

## 2018-06-15 DIAGNOSIS — Z87.898 HISTORY OF PREDIABETES: ICD-10-CM

## 2018-06-15 DIAGNOSIS — E55.9 VITAMIN D DEFICIENCY: ICD-10-CM

## 2018-06-15 RX ORDER — OXYCODONE AND ACETAMINOPHEN 5; 325 MG/1; MG/1
TABLET ORAL
COMMUNITY
Start: 2018-04-03 | End: 2022-05-16 | Stop reason: ALTCHOICE

## 2018-06-15 RX ORDER — LANOLIN ALCOHOL/MO/W.PET/CERES
400 CREAM (GRAM) TOPICAL 2 TIMES DAILY
Qty: 60 TAB | Refills: 2 | Status: SHIPPED | OUTPATIENT
Start: 2018-06-15 | End: 2021-11-18 | Stop reason: SDUPTHER

## 2018-06-15 RX ORDER — MONTELUKAST SODIUM 10 MG/1
10 TABLET ORAL DAILY
Qty: 90 TAB | Refills: 0 | Status: SHIPPED | OUTPATIENT
Start: 2018-06-15 | End: 2021-11-18 | Stop reason: SDUPTHER

## 2018-06-15 RX ORDER — LISINOPRIL 40 MG/1
40 TABLET ORAL DAILY
Qty: 90 TAB | Refills: 1 | Status: SHIPPED | OUTPATIENT
Start: 2018-06-15 | End: 2021-11-18 | Stop reason: SDUPTHER

## 2018-06-15 RX ORDER — FLUTICASONE PROPIONATE AND SALMETEROL 50; 250 UG/1; UG/1
POWDER RESPIRATORY (INHALATION)
COMMUNITY
Start: 2018-05-04 | End: 2018-06-15 | Stop reason: SDUPTHER

## 2018-06-15 RX ORDER — LORATADINE 10 MG/1
10 TABLET ORAL DAILY
Qty: 30 TAB | Refills: 2 | Status: SHIPPED | OUTPATIENT
Start: 2018-06-15 | End: 2021-11-18 | Stop reason: SDUPTHER

## 2018-06-15 RX ORDER — ALBUTEROL SULFATE 90 UG/1
2 AEROSOL, METERED RESPIRATORY (INHALATION)
Qty: 1 INHALER | Refills: 3 | Status: SHIPPED | OUTPATIENT
Start: 2018-06-15 | End: 2021-12-08 | Stop reason: SDUPTHER

## 2018-06-15 RX ORDER — LEVOTHYROXINE SODIUM 25 UG/1
25 TABLET ORAL
Qty: 90 TAB | Refills: 0 | Status: SHIPPED | OUTPATIENT
Start: 2018-06-15 | End: 2021-11-18 | Stop reason: SDUPTHER

## 2018-06-15 RX ORDER — AMITRIPTYLINE HYDROCHLORIDE 50 MG/1
100 TABLET, FILM COATED ORAL
Qty: 90 TAB | Refills: 3 | Status: SHIPPED | OUTPATIENT
Start: 2018-06-15 | End: 2021-11-18 | Stop reason: SDUPTHER

## 2018-06-15 RX ORDER — FLUTICASONE PROPIONATE AND SALMETEROL 50; 250 UG/1; UG/1
1 POWDER RESPIRATORY (INHALATION) 2 TIMES DAILY
Qty: 1 INHALER | Refills: 2 | Status: SHIPPED | OUTPATIENT
Start: 2018-06-15 | End: 2021-08-23

## 2018-06-15 NOTE — MR AVS SNAPSHOT
[address] [place] [address] 
[address] [place] [address] 
[phone] Patient: Xochitl Felix MRN: [id number] WHE:[birth year] Visit Information Date & Time Provider Department Dept. Phone Encounter #  
 6/15/2018 10:20 AM John Jones NP [facility] [id number] Follow-up Instructions Return in about 3 months (around 9/15/2018) for Medication Check, Weight Mgmt, Hypertension, Hypothyroidism. Upcoming Health Maintenance Date Due Pneumococcal 19-64 Medium Risk (1 of 1 - PPSV23) 7/12/2018* BREAST CANCER SCRN MAMMOGRAM 7/13/2018* PAP AKA CERVICAL CYTOLOGY 7/18/2018* DTaP/Tdap/Td series (1 - Tdap) 7/26/2018* COLONOSCOPY 8/8/2018* Influenza Age 5 to Adult 8/1/2018 *Topic was postponed. The date shown is not the original due date. Allergies as of 6/15/2018  Review Complete On: 6/15/2018 By: John Jones NP Severity Noted Reaction Type Reactions Daypro [Oxaprozin]  05/07/2012    Swelling Current Immunizations  Reviewed on 6/15/2018 No immunizations on file. Reviewed by Hany Pereira LPN on 4/82/5002 at 84:35 AM  
You Were Diagnosed With   
  
 Codes Comments Hip pain, bilateral    -  Primary ICD-10-CM: M25.551, M25.552 ICD-9-CM: 719.45 Overweight (BMI 25.0-29. 9)     ICD-10-CM: J18.0 ICD-9-CM: 278.02 S/P bariatric surgery     ICD-10-CM: Z98.84 ICD-9-CM: V45.86 Mild intermittent asthma, unspecified whether complicated     EII-85-CS: J45.20 ICD-9-CM: 493.90 Hypothyroidism, unspecified type     ICD-10-CM: E03.9 ICD-9-CM: 244.9 Essential hypertension     ICD-10-CM: I10 
ICD-9-CM: 401.9 Constipation, unspecified constipation type     ICD-10-CM: K59.00 ICD-9-CM: 564.00 Anemia, unspecified type     ICD-10-CM: D64.9 ICD-9-CM: 285.9 Bariatric surgery status     ICD-10-CM: Z98.84 ICD-9-CM: V45.86   
 History of prediabetes     ICD-10-CM: Z87.898 ICD-9-CM: V12.29 Fatigue, unspecified type     ICD-10-CM: R53.83 ICD-9-CM: 780.79 Mild protein-calorie malnutrition ([facility] [address]     ICD-10-CM: E44.1 ICD-9-CM: 263.1 Lipid screening     ICD-10-CM: V82.292 ICD-9-CM: V77.91 Vitamin D deficiency     ICD-10-CM: E55.9 ICD-9-CM: 268.9 Encounter for hepatitis C screening test for low risk patient     ICD-10-CM: Z11.59 
ICD-9-CM: V73.89 Vitals BP Pulse Temp Resp Height(growth percentile) 146/84 (BP 1 Location: Left arm, BP Patient Position: Sitting) 71 97.9 °F (36.6 °C) (Temporal) 18 5' 3\" (1.6 m) Weight(growth percentile) SpO2 BMI OB Status Smoking Status 162 lb 9.6 oz (73.8 kg) 98% 28.8 kg/m2 Postmenopausal Never Smoker BMI and BSA Data Body Mass Index Body Surface Area  
 28.8 kg/m 2 1.81 m 2 [facility] [address] [phone] Your Updated Medication List  
  
   
This list is accurate as of 6/15/18 11:34 AM.  Always use your most recent med list.  
  
  
  
  
 acetaminophen 500 mg tablet Commonly known as:  TYLENOL Take 500 mg by mouth as needed for Pain. ADVAIR DISKUS 250-50 mcg/dose diskus inhaler Generic drug:  fluticasone-salmeterol Take 1 Puff by inhalation two (2) times a day. albuterol 90 mcg/actuation inhaler Commonly known as:  VENTOLIN HFA Take 2 Puffs by inhalation every four (4) hours as needed for Wheezing or Shortness of Breath. amitriptyline 50 mg tablet Commonly known as:  ELAVIL Take 2 Tabs by mouth nightly. CALCIUM CITRATE PO Take 1 Tab by mouth two (2) times a day. levothyroxine 25 mcg tablet Commonly known as:  SYNTHROID Take 1 Tab by mouth Daily (before breakfast). linaclotide 145 mcg Cap capsule Commonly known as:  Rosebud Gold Take 1 Cap by mouth daily. lisinopril 40 mg tablet Commonly known as:  Tulio Bowling Take 1 Tab by mouth daily. loratadine 10 mg tablet Commonly known as:  Verita Lee Ann Take 1 Tab by mouth daily. magnesium oxide 400 mg tablet Commonly known as:  MAG-OX Take 1 Tab by mouth two (2) times a day. montelukast 10 mg tablet Commonly known as:  SINGULAIR Take 1 Tab by mouth daily. oxyCODONE-acetaminophen 5-325 mg per tablet Commonly known as:  [address] Take 2 Gum by mouth two (2) times a day. Prescriptions Sent to Pharmacy Refills ADVAIR DISKUS 250-50 mcg/dose diskus inhaler 2 Sig: Take 1 Puff by inhalation two (2) times a day. Class: Normal  
 Pharmacy: [facility] [address] Ph #: [phone] Route: Inhalation  
 albuterol (VENTOLIN HFA) 90 mcg/actuation inhaler 3 Sig: Take 2 Puffs by inhalation every four (4) hours as needed for Wheezing or Shortness of Breath. Class: Normal  
 Pharmacy: [address] Ph #: [phone] Route: Inhalation  
 loratadine (CLARITIN) 10 mg tablet 2 Sig: Take 1 Tab by mouth daily. Class: Normal  
 Pharmacy: [facility] [address] Ph #: [phone] Route: Oral  
 levothyroxine (SYNTHROID) 25 mcg tablet 0 Sig: Take 1 Tab by mouth Daily (before breakfast). Class: Normal  
 Pharmacy: [address] Ph #: [phone] Route: Oral  
 lisinopril (PRINIVIL, ZESTRIL) 40 mg tablet 1 Sig: Take 1 Tab by mouth daily. Class: Normal  
 Pharmacy: [facility] [address] Ph #: [phone] Route: Oral  
 montelukast (SINGULAIR) 10 mg tablet 0 Sig: Take 1 Tab by mouth daily. Class: Normal  
 Pharmacy: [facility] [address] Ph #: [phone]  Route: Oral  
 magnesium oxide (MAG-OX) 400 mg tablet 2 Sig: Take 1 Tab by mouth two (2) times a day. Class: Normal  
 Pharmacy: [address] [place] [address] Ph #: [phone] Route: Oral  
 amitriptyline (ELAVIL) 50 mg tablet 3 Sig: Take 2 Tabs by mouth nightly. Class: Normal  
 Pharmacy: [address] [place] [address] Ph #: [phone] Route: Oral  
 linaclotide (LINZESS) 145 mcg cap capsule 2 Sig: Take 1 Cap by mouth daily. Class: Normal  
 Pharmacy: [address] [place] [address] Ph #: [phone] Route: Oral  
  
We Performed the Following CBC WITH AUTOMATED DIFF [58566 CPT(R)] COPPER [id number] CPT(R)] FERRITIN [96946 CPT(R)] HEMOGLOBIN A1C WITH EAG [64055 CPT(R)] HEPATITIS C AB [00085 CPT(R)] IRON PROFILE Y190150 CPT(R)] LIPID PANEL [77726 CPT(R)] METABOLIC PANEL, COMPREHENSIVE [71104 CPT(R)] PREALBUMIN [87693 CPT(R)] PTH INTACT [61259 CPT(R)] TSH 3RD GENERATION [45949 CPT(R)] URINALYSIS W/ RFLX MICROSCOPIC [97271 CPT(R)] VITAMIN B12 [id number] CPT(R)] VITAMIN D, 25 HYDROXY N1977992 CPT(R)] ZINC G9977553 CPT(R)] Follow-up Instructions Return in about 3 months (around 9/15/2018) for Medication Check, Weight Mgmt, Hypertension, Hypothyroidism. Patient Instructions Constipation: Care Instructions Your Care Instructions Constipation means that you have a hard time passing stools (bowel movements). People pass stools from 3 times a day to once every 3 days. What is normal for you may be different. Constipation may occur with pain in the rectum and cramping. The pain may get worse when you try to pass stools. Sometimes there are small amounts of bright red blood on toilet paper or the surface of stools. This is because of enlarged veins near the rectum (hemorrhoids). A few changes in your diet and lifestyle may help you avoid ongoing constipation. Your doctor may also prescribe medicine to help loosen your stool. Some medicines can cause constipation. These include pain medicines and antidepressants. Tell your doctor about all the medicines you take. Your doctor may want to make a medicine change to ease your symptoms. Follow-up care is a key part of your treatment and safety. Be sure to make and go to all appointments, and call your doctor if you are having problems. It's also a good idea to know your test results and keep a list of the medicines you take. How can you care for yourself at home? · Drink plenty of fluids, enough so that your urine is light yellow or clear like water. If you have kidney, heart, or liver disease and have to limit fluids, talk with your doctor before you increase the amount of fluids you drink. · Include high-fiber foods in your diet each day. These include fruits, vegetables, beans, and whole grains. · Get at least 30 minutes of exercise on most days of the week. Walking is a good choice. You also may want to do other activities, such as running, swimming, cycling, or playing tennis or team sports. · Take a fiber supplement, such as Citrucel or Metamucil, every day. Read and follow all instructions on the label. · Schedule time each day for a bowel movement. A daily routine may help. Take your time having your bowel movement. · Support your feet with a small step stool when you sit on the toilet. This helps flex your hips and places your pelvis in a squatting position. · Your doctor may recommend an over-the-counter laxative to relieve your constipation. Examples are Milk of Magnesia and MiraLax. Read and follow all instructions on the label. Do not use laxatives on a long-term basis. When should you call for help? Call your doctor now or seek immediate medical care if: 
? · You have new or worse belly pain. ? · You have new or worse nausea or vomiting. ? · You have blood in your stools. ? Watch closely for changes in your health, and be sure to contact your doctor if: 
? · Your constipation is getting worse. ? · You do not get better as expected. Where can you learn more? Go to http://federico-suha.info/. Enter 21  in the search box to learn more about \"Constipation: Care Instructions. \" Current as of: March 20, 2017 Content Version: 11.4 © 9159-0209 [employer]. Care instructions adapted under license by [employer] (which disclaims liability or warranty for this information). If you have questions about a medical condition or this instruction, always ask your healthcare professional. [address] any warranty or liability for your use of this information. Hypothyroidism: Care Instructions Your Care Instructions You have hypothyroidism, which means that your body is not making enough thyroid hormone. This hormone helps your body use energy. If your thyroid level is low, you may feel tired, be constipated, have an increase in your blood pressure, or have dry skin or memory problems. You may also get cold easily, even when it is warm. Women with low thyroid levels may have heavy menstrual periods. A blood test to find your thyroid-stimulating hormone (TSH) level is used to check for hypothyroidism. A high TSH level may mean that you have low thyroid. When your body is not making enough thyroid hormone, TSH levels rise in an effort to make the body produce more. The treatment for hypothyroidism is to take thyroid hormone pills. You should start to feel better in 1 to 2 weeks. But it can take several months to see changes in the TSH level. You will need regular visits with your doctor to make sure you have the right dose of medicine. Most people need treatment for the rest of their lives.  You will need to see your doctor regularly to have blood tests and to make sure you are doing well. Follow-up care is a key part of your treatment and safety. Be sure to make and go to all appointments, and call your doctor if you are having problems. It's also a good idea to know your test results and keep a list of the medicines you take. How can you care for yourself at home? · Take your thyroid hormone medicine exactly as prescribed. Call your doctor if you think you are having a problem with your medicine. Most people do not have side effects if they take the right amount of medicine regularly. ¨ Take the medicine 30 minutes before breakfast, and do not take it with calcium, vitamins, or iron. ¨ Do not take extra doses of your thyroid medicine. It will not help you get better any faster, and it may cause side effects. ¨ If you forget to take a dose, do NOT take a double dose of medicine. Take your usual dose the next day. · Tell your doctor about all prescription, herbal, or over-the-counter products you take. · Take care of yourself. Eat a healthy diet, get enough sleep, and get regular exercise. When should you call for help? Call 911 anytime you think you may need emergency care. For example, call if: 
? · You passed out (lost consciousness). ? · You have severe trouble breathing. ? · You have a very slow heartbeat (less than 60 beats a minute). ? · You have a low body temperature (95°F or below). ?Call your doctor now or seek immediate medical care if: 
? · You feel tired, sluggish, or weak. ? · You have trouble remembering things or concentrating. ? · You do not begin to feel better 2 weeks after starting your medicine. ? Watch closely for changes in your health, and be sure to contact your doctor if you have any problems. Where can you learn more? Go to http://federico-suha.info/. Enter W330 in the search box to learn more about \"Hypothyroidism: Care Instructions. \" 
 Current as of: May 12, 2017 Content Version: 11.4 © 8371-4244 [employer], [employer] Care instructions adapted under license by [employer] (which disclaims liability or warranty for this information). If you have questions about a medical condition or this instruction, always ask your healthcare professional. [address] any warranty or liability for your use of this information. Introducing [facility]! Luis Reed introduces Excellence4u patient portal. Now you can access parts of your medical record, email your doctor's office, and request medication refills online. 1. In your internet browser, go to [email or web address] 2. Click on the First Time User? Click Here link in the Sign In box. You will see the New Member Sign Up page. 3. Enter your Excellence4u Access Code exactly as it appears below. You will not need to use this code after youve completed the sign-up process. If you do not sign up before the expiration date, you must request a new code. · Excellence4u Access Code: [email or web address] Expires: 9/13/2018 10:09 AM 
 
4. Enter the last four digits of your Social Security Number (xxxx) and Date of Birth (mm/dd/yyyy) as indicated and click Submit. You will be taken to the next sign-up page. 5. Create a Excellence4u ID. This will be your Excellence4u login ID and cannot be changed, so think of one that is secure and easy to remember. 6. Create a Excellence4u password. You can change your password at any time. 7. Enter your Password Reset Question and Answer. This can be used at a later time if you forget your password. 8. Enter your e-mail address. You will receive e-mail notification when new information is available in [address] 9. Click Sign Up. You can now view and download portions of your medical record. 10. Click the Download Summary menu link to download a portable copy of your medical information. If you have questions, please visit the Frequently Asked Questions section of the [employer] website. Remember, [email or web address] is NOT to be used for urgent needs. For medical emergencies, dial 911. Now available from your iPhone and Android! Please provide this summary of care documentation to your next provider. Your primary care clinician is listed as Sven Miner. If you have any questions after today's visit, please call [phone].

## 2018-06-15 NOTE — PROGRESS NOTES
Ariana Shah  Identified pt with two pt identifiers(name and ). Chief Complaint   Patient presents with    Weight Management     Rm6: 3mos weight management update    Medication Refill       1. Have you been to the ER, urgent care clinic since your last visit? Hospitalized since your last visit? no    2. Have you seen or consulted any other health care providers outside of the [address] [place] [address] since your last visit? Include any pap smears or colon screening. no    Today's provider has been notified of reason for visit, vitals and flowsheets obtained on patients. Patient received paperwork for advance directive during previous visit but has not completed at this time     Reviewed record In preparation for visit, huddled with provider and have obtained necessary documentation      There are no preventive care reminders to display for this patient. Wt Readings from Last 3 Encounters:   06/15/18 162 lb 9.6 oz (73.8 kg)   03/15/18 157 lb 3.2 oz (71.3 kg)   17 171 lb (77.6 kg)     Temp Readings from Last 3 Encounters:   06/15/18 97.9 °F (36.6 °C) (Temporal)   03/15/18 98 °F (36.7 °C) (Oral)   17 98.2 °F (36.8 °C) (Temporal)     BP Readings from Last 3 Encounters:   06/15/18 146/84   03/15/18 132/74   17 108/66     Pulse Readings from Last 3 Encounters:   06/15/18 71   03/15/18 63   17 66     Vitals:    06/15/18 1051   BP: 146/84   Pulse: 71   Resp: 18   Temp: 97.9 °F (36.6 °C)   TempSrc: Temporal   SpO2: 98%   Weight: 162 lb 9.6 oz (73.8 kg)   Height: 5' 3\" (1.6 m)   PainSc:   0 - No pain         Learning Assessment:  :     No flowsheet data found. Depression Screening:  :     PHQ over the last two weeks 6/15/2018   Little interest or pleasure in doing things Not at all   Feeling down, depressed or hopeless Not at all   Total Score PHQ 2 0       Fall Risk Assessment:  :     No flowsheet data found. Abuse Screening:  :     No flowsheet data found.     ADL Screening:  : ADL Assessment 6/15/2018   Feeding yourself No Help Needed   Getting from bed to chair No Help Needed   Getting dressed No Help Needed   Bathing or showering No Help Needed   Walk across the room (includes cane/walker) No Help Needed   Using the telphone No Help Needed   Taking your medications No Help Needed   Preparing meals No Help Needed   Managing money (expenses/bills) No Help Needed   Moderately strenuous housework (laundry) No Help Needed   Shopping for personal items (toiletries/medicines) No Help Needed   Shopping for groceries No Help Needed   Driving No Help Needed   Climbing a flight of stairs No Help Needed   Getting to places beyond walking distances No Help Needed     Body Weight:  Body Fat:  Muscle Mass: Body H2O:  BMR:  BMI:  Waist Circumference:  Weight Lost/Gained since the last visit:  Weight Lost since Surgery (if applicable):              Medication reconciliation up to date and corrected with patient at this time.

## 2018-06-15 NOTE — PROGRESS NOTES
Chief Complaint   Patient presents with    Weight Management     Rm6: 3mos weight management update    Medication Refill         HPI:  The patient is a 60 y.o. female who presents today for a follow up appointment. No hospital, ER or specialist visits since last primary care visit except as noted below. Hip Pain: Pt had left rotator cuff repair on April 3, 2018. She has bilateral hip pain and used to get steroid injections in her hips w/ Dr. Jalen Garsia.  Today she reports her hip pain bilaterally is about a 5/10 which is much better. She has had bilateral steroid injections since her last visit. Pain is improved. Dr. Heloise Hamman     Weight Mgmt:  She is s/p laparoscopic GBP in 2014 with Dr. Bryon Cardenas. Pt is here today in f/u for medication management for weight loss. Her preoperative weight was 289 pounds. Her HW was 313 pounds. The patient's goal weight is 140 pounds. Her weight today is 162 pounds with a BMI of 28.80. She is unsure how much weight she has lost since she saw me last in summer of 2017 at [address] [place] [address]. She used to be on Contrave for approximately 3 months. She denies adverse effects, she felt like it was somewhat effective. Diet:  The patient has made the following diet modifications: she is doing very well with her diet protocol. Pt is tolerating solids well and is getting in adequate protein. Pt is measuring meals with \"eyeballing\". Hydration: Pt is getting in adequate fluids. Pt is not drinking with meals. Pt is not drinking liquid calories. The patient is drinking the following: water, The patient is getting in at least 64+ oz of fluids daily. Exercise: Pt is using the treadmill, walking/jogging at least 3x/week         Weights: The patient is weighing daily         Vitamins: Pt is taking the recommended postoperative nutritional supplements. Associated Symptoms: Pt denies N/V, GERD, night time regurgitation and episodes of obstruction.  Pt denies abdominal pain. Pt c/o constipation despite taking Miralax. Pt started Linzess after her last visit. It worked very well but her insurance denied it after one month. She would like to restart this if possible. Pt denies diarrhea. Review of Systems  A comprehensive review of systems was negative except for that written in the HPI. Patient Active Problem List   Diagnosis Code    HTN (hypertension) I10    Asthma J45.909    S/P bariatric surgery Z98.84    Hypothyroid E03.9    Hip pain, bilateral M25.551, M25.552    Constipation K59.00       Past Medical History:   Diagnosis Date    Asthma     resolved    Dental disorder NEC     Diabetes ([address]     resolved    Dyspepsia and other specified disorders of function of stomach     Hypertension     resolved    Hypothyroid     Morbid obesity ([facility] [address] 5/22/2012    Palpitation        Past Surgical History:   Procedure Laterality Date    CARDIAC SURG PROCEDURE UNLIST      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, 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    HX CHOLECYSTECTOMY  2007   [address] AND DRAINAGE  2007    HX GASTRIC BYPASS  2014    hospitals    C/S    HX ORTHOPAEDIC  2011    carpal tunnel right hand, with trigger finger release    ME CLOSED RX TOE FX  1984    pins placed       Social History   Substance Use Topics    Smoking status: Never Smoker    Smokeless tobacco: Never Used    Alcohol use No       Family History   Problem Relation Age of Onset    Diabetes Father     Asthma Father     Hypertension Father     Hypertension Sister     Hypertension Brother        Outpatient Prescriptions Marked as Taking for the 6/15/18 encounter (Office Visit) with Jaxon Benjamin NP   Medication Sig Dispense Refill    oxyCODONE-acetaminophen (PERCOCET) 5-325 mg per tablet       ADVAIR DISKUS 250-50 mcg/dose diskus inhaler Take 1 Puff by inhalation two (2) times a day.  1 Inhaler 2    albuterol (VENTOLIN HFA) 90 mcg/actuation inhaler Take 2 Puffs by inhalation every four (4) hours as needed for Wheezing or Shortness of Breath. 1 Inhaler 3    loratadine (CLARITIN) 10 mg tablet Take 1 Tab by mouth daily. 30 Tab 2    levothyroxine (SYNTHROID) 25 mcg tablet Take 1 Tab by mouth Daily (before breakfast). 90 Tab 0    lisinopril (PRINIVIL, ZESTRIL) 40 mg tablet Take 1 Tab by mouth daily. 90 Tab 1    montelukast (SINGULAIR) 10 mg tablet Take 1 Tab by mouth daily. 90 Tab 0    magnesium oxide (MAG-OX) 400 mg tablet Take 1 Tab by mouth two (2) times a day. 60 Tab 2    amitriptyline (ELAVIL) 50 mg tablet Take 2 Tabs by mouth nightly. 90 Tab 3    linaclotide (LINZESS) 145 mcg cap capsule Take 1 Cap by mouth daily. 30 Cap 2    MULTIVIT-MINERALS/FOLIC ACID (WOMENS DAILY GUMMIES PO) Take 2 Gum by mouth two (2) times a day.  CALCIUM CITRATE PO Take 1 Tab by mouth two (2) times a day.  acetaminophen (TYLENOL) 500 mg tablet Take 500 mg by mouth as needed for Pain. Allergies   Allergen Reactions    Daypro [Oxaprozin] Swelling       PE:  Visit Vitals    /84 (BP 1 Location: Left arm, BP Patient Position: Sitting)    Pulse 71    Temp 97.9 °F (36.6 °C) (Temporal)    Resp 18    Ht 5' 3\" (1.6 m)    Wt 162 lb 9.6 oz (73.8 kg)    SpO2 98%    BMI 28.8 kg/m2     Gen: alert, oriented, no acute distress  Head: normocephalic, atraumatic  Ears: external auditory canals clear, TMs without erythema or effusion  Eyes: pupils equal round reactive to light, sclera clear, conjunctiva clear  Oral: moist mucus membranes, no oral lesions, no pharyngeal inflammation or exudate  Neck: symmetric normal sized thyroid, no carotid bruits, no jugular vein distention  Resp: no increase work of breathing, lungs clear to ausculation bilaterally, no wheezing, rales or rhonchi  CV: S1, S2 normal.  No murmurs, rubs, or gallops. Abd: soft, not tender, not distended. No hepatosplenomegaly. Normal bowel sounds. No hernias. No abdominal or renal bruits.   Neuro: cranial nerves intact, normal strength and movement in all extremities, reflexes and sensation intact and symmetric. Skin: no lesion or rash  Extremities: no cyanosis or edema    Assessment/Plan:    ICD-10-CM ICD-9-CM    1. Hip pain, bilateral M25.551 719.45     M25.552     2. Overweight (BMI 25.0-29. 9) E66.3 278.02 ADVAIR DISKUS 250-50 mcg/dose diskus inhaler      albuterol (VENTOLIN HFA) 90 mcg/actuation inhaler      loratadine (CLARITIN) 10 mg tablet      levothyroxine (SYNTHROID) 25 mcg tablet      lisinopril (PRINIVIL, ZESTRIL) 40 mg tablet      montelukast (SINGULAIR) 10 mg tablet      magnesium oxide (MAG-OX) 400 mg tablet      amitriptyline (ELAVIL) 50 mg tablet      linaclotide (LINZESS) 145 mcg cap capsule      URINALYSIS W/ RFLX MICROSCOPIC      LIPID PANEL      METABOLIC PANEL, COMPREHENSIVE      TSH 3RD GENERATION      VITAMIN D, 25 HYDROXY      HEMOGLOBIN A1C WITH EAG      CBC WITH AUTOMATED DIFF      VITAMIN B12      COPPER      ZINC      IRON PROFILE      PTH INTACT      PREALBUMIN      FERRITIN   3. S/P bariatric surgery Z98.84 V45.86 ADVAIR DISKUS 250-50 mcg/dose diskus inhaler      albuterol (VENTOLIN HFA) 90 mcg/actuation inhaler      loratadine (CLARITIN) 10 mg tablet      levothyroxine (SYNTHROID) 25 mcg tablet      lisinopril (PRINIVIL, ZESTRIL) 40 mg tablet      montelukast (SINGULAIR) 10 mg tablet      magnesium oxide (MAG-OX) 400 mg tablet      amitriptyline (ELAVIL) 50 mg tablet      linaclotide (LINZESS) 145 mcg cap capsule      URINALYSIS W/ RFLX MICROSCOPIC      LIPID PANEL      METABOLIC PANEL, COMPREHENSIVE      TSH 3RD GENERATION      VITAMIN D, 25 HYDROXY      HEMOGLOBIN A1C WITH EAG      CBC WITH AUTOMATED DIFF      VITAMIN B12      COPPER      ZINC      IRON PROFILE      PTH INTACT      PREALBUMIN      FERRITIN   4. Mild intermittent asthma, unspecified whether complicated X50.35 587.38 albuterol (VENTOLIN HFA) 90 mcg/actuation inhaler      montelukast (SINGULAIR) 10 mg tablet   5.  Hypothyroidism, unspecified type E03.9 244.9 levothyroxine (SYNTHROID) 25 mcg tablet      TSH 3RD GENERATION   6. Essential hypertension I10 401.9 ADVAIR DISKUS 250-50 mcg/dose diskus inhaler      albuterol (VENTOLIN HFA) 90 mcg/actuation inhaler      loratadine (CLARITIN) 10 mg tablet      levothyroxine (SYNTHROID) 25 mcg tablet      lisinopril (PRINIVIL, ZESTRIL) 40 mg tablet      montelukast (SINGULAIR) 10 mg tablet      magnesium oxide (MAG-OX) 400 mg tablet      amitriptyline (ELAVIL) 50 mg tablet      linaclotide (LINZESS) 145 mcg cap capsule      URINALYSIS W/ RFLX MICROSCOPIC      LIPID PANEL      METABOLIC PANEL, COMPREHENSIVE      TSH 3RD GENERATION      VITAMIN D, 25 HYDROXY      HEMOGLOBIN A1C WITH EAG      CBC WITH AUTOMATED DIFF      VITAMIN B12      COPPER      ZINC      IRON PROFILE      PTH INTACT      PREALBUMIN      FERRITIN   7. Constipation, unspecified constipation type K59.00 564.00 linaclotide (LINZESS) 145 mcg cap capsule   8. Anemia, unspecified type D64.9 285.9 ADVAIR DISKUS 250-50 mcg/dose diskus inhaler      albuterol (VENTOLIN HFA) 90 mcg/actuation inhaler      loratadine (CLARITIN) 10 mg tablet      levothyroxine (SYNTHROID) 25 mcg tablet      lisinopril (PRINIVIL, ZESTRIL) 40 mg tablet      montelukast (SINGULAIR) 10 mg tablet      magnesium oxide (MAG-OX) 400 mg tablet      amitriptyline (ELAVIL) 50 mg tablet      linaclotide (LINZESS) 145 mcg cap capsule      URINALYSIS W/ RFLX MICROSCOPIC      LIPID PANEL      METABOLIC PANEL, COMPREHENSIVE      TSH 3RD GENERATION      VITAMIN D, 25 HYDROXY      HEMOGLOBIN A1C WITH EAG      CBC WITH AUTOMATED DIFF      VITAMIN B12      COPPER      ZINC      IRON PROFILE      PTH INTACT      PREALBUMIN      FERRITIN   9.  Bariatric surgery status Z98.84 V45.86 ADVAIR DISKUS 250-50 mcg/dose diskus inhaler      albuterol (VENTOLIN HFA) 90 mcg/actuation inhaler      loratadine (CLARITIN) 10 mg tablet      levothyroxine (SYNTHROID) 25 mcg tablet      lisinopril (PRINIVIL, ZESTRIL) 40 mg tablet montelukast (SINGULAIR) 10 mg tablet      magnesium oxide (MAG-OX) 400 mg tablet      amitriptyline (ELAVIL) 50 mg tablet      linaclotide (LINZESS) 145 mcg cap capsule      URINALYSIS W/ RFLX MICROSCOPIC      LIPID PANEL      METABOLIC PANEL, COMPREHENSIVE      TSH 3RD GENERATION      VITAMIN D, 25 HYDROXY      HEMOGLOBIN A1C WITH EAG      CBC WITH AUTOMATED DIFF      VITAMIN B12      COPPER      ZINC      IRON PROFILE      PTH INTACT      PREALBUMIN      FERRITIN   10. History of prediabetes Z87.898 V12.29 ADVAIR DISKUS 250-50 mcg/dose diskus inhaler      albuterol (VENTOLIN HFA) 90 mcg/actuation inhaler      loratadine (CLARITIN) 10 mg tablet      levothyroxine (SYNTHROID) 25 mcg tablet      lisinopril (PRINIVIL, ZESTRIL) 40 mg tablet      montelukast (SINGULAIR) 10 mg tablet      magnesium oxide (MAG-OX) 400 mg tablet      amitriptyline (ELAVIL) 50 mg tablet      linaclotide (LINZESS) 145 mcg cap capsule      URINALYSIS W/ RFLX MICROSCOPIC      LIPID PANEL      METABOLIC PANEL, COMPREHENSIVE      TSH 3RD GENERATION      VITAMIN D, 25 HYDROXY      HEMOGLOBIN A1C WITH EAG      CBC WITH AUTOMATED DIFF      VITAMIN B12      COPPER      ZINC      IRON PROFILE      PTH INTACT      PREALBUMIN      FERRITIN   11.  Fatigue, unspecified type R53.83 780.79 ADVAIR DISKUS 250-50 mcg/dose diskus inhaler      albuterol (VENTOLIN HFA) 90 mcg/actuation inhaler      loratadine (CLARITIN) 10 mg tablet      levothyroxine (SYNTHROID) 25 mcg tablet      lisinopril (PRINIVIL, ZESTRIL) 40 mg tablet      montelukast (SINGULAIR) 10 mg tablet      magnesium oxide (MAG-OX) 400 mg tablet      amitriptyline (ELAVIL) 50 mg tablet      linaclotide (LINZESS) 145 mcg cap capsule      URINALYSIS W/ RFLX MICROSCOPIC      LIPID PANEL      METABOLIC PANEL, COMPREHENSIVE      TSH 3RD GENERATION      VITAMIN D, 25 HYDROXY      HEMOGLOBIN A1C WITH EAG      CBC WITH AUTOMATED DIFF      VITAMIN B12      COPPER      ZINC      IRON PROFILE      PTH INTACT PREALBUMIN      FERRITIN   12. Mild protein-calorie malnutrition (HCC) E44.1 263.1 ADVAIR DISKUS 250-50 mcg/dose diskus inhaler      albuterol (VENTOLIN HFA) 90 mcg/actuation inhaler      loratadine (CLARITIN) 10 mg tablet      levothyroxine (SYNTHROID) 25 mcg tablet      lisinopril (PRINIVIL, ZESTRIL) 40 mg tablet      montelukast (SINGULAIR) 10 mg tablet      magnesium oxide (MAG-OX) 400 mg tablet      amitriptyline (ELAVIL) 50 mg tablet      linaclotide (LINZESS) 145 mcg cap capsule      URINALYSIS W/ RFLX MICROSCOPIC      LIPID PANEL      METABOLIC PANEL, COMPREHENSIVE      TSH 3RD GENERATION      VITAMIN D, 25 HYDROXY      HEMOGLOBIN A1C WITH EAG      CBC WITH AUTOMATED DIFF      VITAMIN B12      COPPER      ZINC      IRON PROFILE      PTH INTACT      PREALBUMIN      FERRITIN   13. Lipid screening Z13.220 V77.91 LIPID PANEL   14. Vitamin D deficiency E55.9 268.9 VITAMIN D, 25 HYDROXY   15. Encounter for hepatitis C screening test for low risk patient Z11.59 V73.89 HEPATITIS C AB     Follow-up Disposition:  Return in about 3 months (around 9/15/2018) for Medication Check, Weight Mgmt, Hypertension, Hypothyroidism. lab results and schedule of future lab studies reviewed with patient  reviewed diet, exercise and weight control  reviewed medications and side effects in detail    Health Maintenance reviewed - reviewed, UTD. Recommended healthy diet low in carbohydrates, fats, sodium and cholesterol. Recommended regular cardiovascular exercise 3-6 times per week for 30-60 minutes daily. Chart is reviewed and updated today in the office. Records requested for other providers patient has seen and is currently seeing. Patient was offered a choice/choices in the treatment plan today. Patient expresses understanding of the plan and agrees with recommendations. Verbal and written instructions (see AVS) provided. See patient instructions for more.  Patient expresses understanding of diagnosis and treatment plan.

## 2018-06-15 NOTE — LETTER
6/29/2018 6:19 PM 
 
Ms. Aliyah Vuong [phone] [facility] [id number] Dear Ashley Willis: 
 
 Your lab results have been reviewed and are as follows:  
 
Urine Test:  
Your urine analysis is normal.  
 
Cholesterol Panel:  Excellent! Total Cholesterol is 137 (goal <200). Triglycerides are 89.  (goal <150) Your HDL or \"good\" cholesterol is 70. (goal >40). Your LDL or \"bad\" cholesterol is 49.  (goal <100). CMP:  
Fasting blood sugar is normal at 93, your hemoglobin A1C is 5.5.  You do not have diabetes. Kidney function is normal.  
Your electrolytes are normal.  
Your liver function is normal.  
 
Thyroid:  
Your thyroid function is normal.  
 
Vitamin D is normal. Try to get 10-15 minutes of sunlight daily without burning and try to eat foods such as low fat dairy which is Vitamin D fortified. CBC:  
Your red blood cell count is normal.  
Your white blood cell count is normal.  
Your platelet count is normal.  
You are slightly anemic and your hemoglobin is 10.7. Your iron panel is abnormal and slightly low.    
Make sure you are taking your nutritional supplements with iron. Your Vitamin B12 level is normal.  
Your prealbumin level is normal.  
Your zinc and copper levels are normal.  
Your folate level is normal.  
Your PTH level is normal.  
Your Ferritin level is low. Hepatitis C test is negative. Resulted Orders URINALYSIS W/ RFLX MICROSCOPIC Result Value Ref Range Specific Gravity 1.010 1.005 - 1.030  
 pH (UA) 6.5 5.0 - 7.5 Color Yellow Yellow Appearance Clear Clear Leukocyte Esterase Negative Negative Protein Negative Negative/Trace Glucose Negative Negative Ketone Negative Negative Blood Negative Negative Bilirubin Negative Negative Urobilinogen 0.2 0.2 - 1.0 mg/dL Nitrites Negative Negative Microscopic Examination Comment Comment:  
   Microscopic not indicated and not performed. Narrative Performed at:  [address]  [id number] : Mary Hill MD, Phone:  [phone] LIPID PANEL Result Value Ref Range Cholesterol, total 137 100 - 199 mg/dL Triglyceride 89 0 - 149 mg/dL HDL Cholesterol 70 >39 mg/dL VLDL, calculated 18 5 - 40 mg/dL LDL, calculated 49 0 - 99 mg/dL Narrative Performed at:  [address]  [id number] : Mary Hill MD, Phone:  [phone] METABOLIC PANEL, COMPREHENSIVE Result Value Ref Range Glucose 93 65 - 99 mg/dL BUN 8 6 - 24 mg/dL Creatinine 0.64 0.57 - 1.00 mg/dL GFR est non-AA 98 >59 mL/min/1.73 GFR est  >59 mL/min/1.73  
 BUN/Creatinine ratio 13 9 - 23 Sodium 143 134 - 144 mmol/L Potassium 4.7 3.5 - 5.2 mmol/L Chloride 102 96 - 106 mmol/L  
 CO2 29 20 - 29 mmol/L Comment: **Please note reference interval change** Calcium 9.1 8.7 - 10.2 mg/dL Protein, total 6.4 6.0 - 8.5 g/dL Albumin 4.1 3.5 - 5.5 g/dL GLOBULIN, TOTAL 2.3 1.5 - 4.5 g/dL A-G Ratio 1.8 1.2 - 2.2 Bilirubin, total <0.2 0.0 - 1.2 mg/dL Alk. phosphatase 82 39 - 117 IU/L  
 AST (SGOT) 14 0 - 40 IU/L  
 ALT (SGPT) 11 0 - 32 IU/L Narrative Performed at:  [address]  [id number] : Mary Hill MD, Phone:  [phone] TSH 3RD GENERATION Result Value Ref Range TSH 1.350 0.450 - 4.500 uIU/mL Narrative Performed at:  [address]  [id number] : Mary Hill MD, Phone:  [phone] VITAMIN D, 25 HYDROXY Result Value Ref Range VITAMIN D, 25-HYDROXY 39.2 30.0 - 100.0 ng/mL Comment:  
   Vitamin D deficiency has been defined by the [facility] practice guideline as a 
level of serum 25-OH vitamin D less than 20 ng/mL (1,2). The Endocrine Society went on to further define vitamin D 
insufficiency as a level between 21 and 29 ng/mL (2). 1. IOM ([place] of [employer]). 2010. Dietary reference 
   intakes for calcium and D. [facility]: The 
   [employer]. 2. Jesenia MF, Kassie NC, Franky RED, et al. 
   Evaluation, treatment, and prevention of vitamin D 
   deficiency: an Endocrine Society clinical practice 
   guideline. JCEM. 2011 Jul; 96(7):1911-30. Narrative Performed at:  [address]  [id number] : Mary Valentin MD, Phone:  [phone] HEMOGLOBIN A1C WITH EAG Result Value Ref Range Hemoglobin A1c 5.5 4.8 - 5.6 % Comment:  
            Pre-diabetes: 5.7 - 6.4 Diabetes: >6.4 Glycemic control for adults with diabetes: <7.0 Estimated average glucose 111 mg/dL Narrative Performed at:  [address]  [id number] : Mary Valentin MD, Phone:  [phone] CBC WITH AUTOMATED DIFF Result Value Ref Range WBC 7.3 3.4 - 10.8 x10E3/uL  
 RBC 4.09 3.77 - 5.28 x10E6/uL HGB 10.7 (L) 11.1 - 15.9 g/dL HCT 34.1 34.0 - 46.6 % MCV 83 79 - 97 fL  
 MCH 26.2 (L) 26.6 - 33.0 pg  
 MCHC 31.4 (L) 31.5 - 35.7 g/dL  
 RDW 14.8 12.3 - 15.4 % PLATELET 402 185 - 649 x10E3/uL NEUTROPHILS 68 Not Estab. % Lymphocytes 22 Not Estab. % MONOCYTES 7 Not Estab. % EOSINOPHILS 2 Not Estab. % BASOPHILS 1 Not Estab. %  
 ABS. NEUTROPHILS 5.0 1.4 - 7.0 x10E3/uL Abs Lymphocytes 1.6 0.7 - 3.1 x10E3/uL  
 ABS. MONOCYTES 0.5 0.1 - 0.9 x10E3/uL  
 ABS. EOSINOPHILS 0.1 0.0 - 0.4 x10E3/uL  
 ABS. BASOPHILS 0.1 0.0 - 0.2 x10E3/uL IMMATURE GRANULOCYTES 0 Not Estab. %  
 ABS. IMM. GRANS. 0.0 0.0 - 0.1 x10E3/uL Narrative Performed at:  [address]  [id number] : Kirk Romero MD, Phone:  [phone] VITAMIN B12 Result Value Ref Range Vitamin B12 478 232 - 1245 pg/mL Narrative Performed at:  [address]  [id number] : Kirk Romero MD, Phone:  [phone] COPPER Result Value Ref Range Copper, serum 130 72 - 166 ug/dL Comment:  
                                   Detection Limit = 5 Narrative Performed at:  [address]  [id number] : Kirk Romero MD, Phone:  [phone] ZINC Result Value Ref Range Zinc, plasma/serum 96 56 - 134 ug/dL Comment:  
                                   Detection Limit = 5 Narrative Performed at:  [address]  [id number] : Kirk Romero MD, Phone:  [phone] IRON PROFILE Result Value Ref Range TIBC 440 250 - 450 ug/dL UIBC 405 131 - 425 ug/dL Iron 35 27 - 159 ug/dL Iron % saturation 8 (LL) 15 - 55 % Narrative Performed at:  [address]  [id number] : Kirk Romero MD, Phone:  [phone] PTH INTACT Result Value Ref Range PTH, Intact 30 15 - 65 pg/mL Narrative Performed at:  [address]  [id number] : Kirk Romero MD, Phone:  [phone] PREALBUMIN Result Value Ref Range Prealbumin 21 10 - 36 mg/dL Narrative Performed at:  [address]  [id number] : Kirk Romero MD, Phone:  [phone] FERRITIN Result Value Ref Range Ferritin 6 (L) 15 - 150 ng/mL Narrative Performed at:  [address]  [id number] : Kirk Romero MD, Phone:  [phone] HEPATITIS C AB Result Value Ref Range Hep C Virus Ab <0.1 0.0 - 0.9 s/co ratio Comment:  
                                     Negative:     < 0.8 Indeterminate: 0.8 - 0.9 Positive:     > 0.9 The CDC recommends that a positive HCV antibody result 
 be followed up with a HCV Nucleic Acid Amplification 
 test (221421). Narrative Performed at:  [address]  [id number] : Roger Parada MD, Phone:  [phone] CVD REPORT Result Value Ref Range INTERPRETATION Note Comment:  
   Supplemental report is available. Narrative Performed at:  [address] [place] [address] [address] [place] [address]  [id number] : Bubba Benton MD, Phone:  [phone] RECOMMENDATIONS: 
None. Keep up the good work! Please call me if you have any questions: [phone] Sincerely, Kale Plascencia NP

## 2018-06-15 NOTE — PATIENT INSTRUCTIONS
Constipation: Care Instructions  Your Care Instructions    Constipation means that you have a hard time passing stools (bowel movements). People pass stools from 3 times a day to once every 3 days. What is normal for you may be different. Constipation may occur with pain in the rectum and cramping. The pain may get worse when you try to pass stools. Sometimes there are small amounts of bright red blood on toilet paper or the surface of stools. This is because of enlarged veins near the rectum (hemorrhoids). A few changes in your diet and lifestyle may help you avoid ongoing constipation. Your doctor may also prescribe medicine to help loosen your stool. Some medicines can cause constipation. These include pain medicines and antidepressants. Tell your doctor about all the medicines you take. Your doctor may want to make a medicine change to ease your symptoms. Follow-up care is a key part of your treatment and safety. Be sure to make and go to all appointments, and call your doctor if you are having problems. It's also a good idea to know your test results and keep a list of the medicines you take. How can you care for yourself at home? · Drink plenty of fluids, enough so that your urine is light yellow or clear like water. If you have kidney, heart, or liver disease and have to limit fluids, talk with your doctor before you increase the amount of fluids you drink. · Include high-fiber foods in your diet each day. These include fruits, vegetables, beans, and whole grains. · Get at least 30 minutes of exercise on most days of the week. Walking is a good choice. You also may want to do other activities, such as running, swimming, cycling, or playing tennis or team sports. · Take a fiber supplement, such as Citrucel or Metamucil, every day. Read and follow all instructions on the label. · Schedule time each day for a bowel movement. A daily routine may help.  Take your time having your bowel movement. · Support your feet with a small step stool when you sit on the toilet. This helps flex your hips and places your pelvis in a squatting position. · Your doctor may recommend an over-the-counter laxative to relieve your constipation. Examples are Milk of Magnesia and MiraLax. Read and follow all instructions on the label. Do not use laxatives on a long-term basis. When should you call for help? Call your doctor now or seek immediate medical care if:  ? · You have new or worse belly pain. ? · You have new or worse nausea or vomiting. ? · You have blood in your stools. ? Watch closely for changes in your health, and be sure to contact your doctor if:  ? · Your constipation is getting worse. ? · You do not get better as expected. Where can you learn more? Go to http://federico-suha.info/. Enter 21 [id number] in the search box to learn more about \"Constipation: Care Instructions. \"  Current as of: March 20, 2017  Content Version: 11.4  © 6298-3824 [employer]. Care instructions adapted under license by [employer] (which disclaims liability or warranty for this information). If you have questions about a medical condition or this instruction, always ask your healthcare professional. [address] any warranty or liability for your use of this information. Hypothyroidism: Care Instructions  Your Care Instructions    You have hypothyroidism, which means that your body is not making enough thyroid hormone. This hormone helps your body use energy. If your thyroid level is low, you may feel tired, be constipated, have an increase in your blood pressure, or have dry skin or memory problems. You may also get cold easily, even when it is warm. Women with low thyroid levels may have heavy menstrual periods. A blood test to find your thyroid-stimulating hormone (TSH) level is used to check for hypothyroidism.  A high TSH level may mean that you have low thyroid. When your body is not making enough thyroid hormone, TSH levels rise in an effort to make the body produce more. The treatment for hypothyroidism is to take thyroid hormone pills. You should start to feel better in 1 to 2 weeks. But it can take several months to see changes in the TSH level. You will need regular visits with your doctor to make sure you have the right dose of medicine. Most people need treatment for the rest of their lives. You will need to see your doctor regularly to have blood tests and to make sure you are doing well. Follow-up care is a key part of your treatment and safety. Be sure to make and go to all appointments, and call your doctor if you are having problems. It's also a good idea to know your test results and keep a list of the medicines you take. How can you care for yourself at home? · Take your thyroid hormone medicine exactly as prescribed. Call your doctor if you think you are having a problem with your medicine. Most people do not have side effects if they take the right amount of medicine regularly. ¨ Take the medicine 30 minutes before breakfast, and do not take it with calcium, vitamins, or iron. ¨ Do not take extra doses of your thyroid medicine. It will not help you get better any faster, and it may cause side effects. ¨ If you forget to take a dose, do NOT take a double dose of medicine. Take your usual dose the next day. · Tell your doctor about all prescription, herbal, or over-the-counter products you take. · Take care of yourself. Eat a healthy diet, get enough sleep, and get regular exercise. When should you call for help? Call 911 anytime you think you may need emergency care. For example, call if:  ? · You passed out (lost consciousness). ? · You have severe trouble breathing. ? · You have a very slow heartbeat (less than 60 beats a minute). ? · You have a low body temperature (95°F or below).    ?Call your doctor now or seek immediate medical care if:  ? · You feel tired, sluggish, or weak. ? · You have trouble remembering things or concentrating. ? · You do not begin to feel better 2 weeks after starting your medicine. ? Watch closely for changes in your health, and be sure to contact your doctor if you have any problems. Where can you learn more? Go to http://federico-suha.info/. Enter M206 in the search box to learn more about \"Hypothyroidism: Care Instructions. \"  Current as of: May 12, 2017  Content Version: 11.4  © 9575-9441 [employer]. Care instructions adapted under license by [employer] (which disclaims liability or warranty for this information). If you have questions about a medical condition or this instruction, always ask your healthcare professional. [address] any warranty or liability for your use of this information.

## 2018-06-16 LAB
25(OH)D3+25(OH)D2 SERPL-MCNC: 39.2 NG/ML (ref 30–100)
ALBUMIN SERPL-MCNC: 4.1 G/DL (ref 3.5–5.5)
ALBUMIN/GLOB SERPL: 1.8 {RATIO} (ref 1.2–2.2)
ALP SERPL-CCNC: 82 IU/L (ref 39–117)
ALT SERPL-CCNC: 11 IU/L (ref 0–32)
APPEARANCE UR: CLEAR
AST SERPL-CCNC: 14 IU/L (ref 0–40)
BASOPHILS # BLD AUTO: 0.1 X10E3/UL (ref 0–0.2)
BASOPHILS NFR BLD AUTO: 1 %
BILIRUB SERPL-MCNC: <0.2 MG/DL (ref 0–1.2)
BILIRUB UR QL STRIP: NEGATIVE
BUN SERPL-MCNC: 8 MG/DL (ref 6–24)
BUN/CREAT SERPL: 13 (ref 9–23)
CALCIUM SERPL-MCNC: 9.1 MG/DL (ref 8.7–10.2)
CHLORIDE SERPL-SCNC: 102 MMOL/L (ref 96–106)
CHOLEST SERPL-MCNC: 137 MG/DL (ref 100–199)
CO2 SERPL-SCNC: 29 MMOL/L (ref 20–29)
COLOR UR: YELLOW
COPPER SERPL-MCNC: 130 UG/DL (ref 72–166)
CREAT SERPL-MCNC: 0.64 MG/DL (ref 0.57–1)
EOSINOPHIL # BLD AUTO: 0.1 X10E3/UL (ref 0–0.4)
EOSINOPHIL NFR BLD AUTO: 2 %
ERYTHROCYTE [DISTWIDTH] IN BLOOD BY AUTOMATED COUNT: 14.8 % (ref 12.3–15.4)
EST. AVERAGE GLUCOSE BLD GHB EST-MCNC: 111 MG/DL
FERRITIN SERPL-MCNC: 6 NG/ML (ref 15–150)
GFR SERPLBLD CREATININE-BSD FMLA CKD-EPI: 113 ML/MIN/1.73
GFR SERPLBLD CREATININE-BSD FMLA CKD-EPI: 98 ML/MIN/1.73
GLOBULIN SER CALC-MCNC: 2.3 G/DL (ref 1.5–4.5)
GLUCOSE SERPL-MCNC: 93 MG/DL (ref 65–99)
GLUCOSE UR QL: NEGATIVE
HBA1C MFR BLD: 5.5 % (ref 4.8–5.6)
HCT VFR BLD AUTO: 34.1 % (ref 34–46.6)
HCV AB S/CO SERPL IA: <0.1 S/CO RATIO (ref 0–0.9)
HDLC SERPL-MCNC: 70 MG/DL
HGB BLD-MCNC: 10.7 G/DL (ref 11.1–15.9)
HGB UR QL STRIP: NEGATIVE
IMM GRANULOCYTES # BLD: 0 X10E3/UL (ref 0–0.1)
IMM GRANULOCYTES NFR BLD: 0 %
INTERPRETATION, 910389: NORMAL
IRON SATN MFR SERPL: 8 % (ref 15–55)
IRON SERPL-MCNC: 35 UG/DL (ref 27–159)
KETONES UR QL STRIP: NEGATIVE
LDLC SERPL CALC-MCNC: 49 MG/DL (ref 0–99)
LEUKOCYTE ESTERASE UR QL STRIP: NEGATIVE
LYMPHOCYTES # BLD AUTO: 1.6 X10E3/UL (ref 0.7–3.1)
LYMPHOCYTES NFR BLD AUTO: 22 %
MCH RBC QN AUTO: 26.2 PG (ref 26.6–33)
MCHC RBC AUTO-ENTMCNC: 31.4 G/DL (ref 31.5–35.7)
MCV RBC AUTO: 83 FL (ref 79–97)
MICRO URNS: NORMAL
MONOCYTES # BLD AUTO: 0.5 X10E3/UL (ref 0.1–0.9)
MONOCYTES NFR BLD AUTO: 7 %
NEUTROPHILS # BLD AUTO: 5 X10E3/UL (ref 1.4–7)
NEUTROPHILS NFR BLD AUTO: 68 %
NITRITE UR QL STRIP: NEGATIVE
PH UR STRIP: 6.5 [PH] (ref 5–7.5)
PLATELET # BLD AUTO: 253 X10E3/UL (ref 150–379)
POTASSIUM SERPL-SCNC: 4.7 MMOL/L (ref 3.5–5.2)
PREALB SERPL-MCNC: 21 MG/DL (ref 10–36)
PROT SERPL-MCNC: 6.4 G/DL (ref 6–8.5)
PROT UR QL STRIP: NEGATIVE
PTH-INTACT SERPL-MCNC: 30 PG/ML (ref 15–65)
RBC # BLD AUTO: 4.09 X10E6/UL (ref 3.77–5.28)
SODIUM SERPL-SCNC: 143 MMOL/L (ref 134–144)
SP GR UR: 1.01 (ref 1–1.03)
TIBC SERPL-MCNC: 440 UG/DL (ref 250–450)
TRIGL SERPL-MCNC: 89 MG/DL (ref 0–149)
TSH SERPL DL<=0.005 MIU/L-ACNC: 1.35 UIU/ML (ref 0.45–4.5)
UIBC SERPL-MCNC: 405 UG/DL (ref 131–425)
UROBILINOGEN UR STRIP-MCNC: 0.2 MG/DL (ref 0.2–1)
VIT B12 SERPL-MCNC: 478 PG/ML (ref 232–1245)
VLDLC SERPL CALC-MCNC: 18 MG/DL (ref 5–40)
WBC # BLD AUTO: 7.3 X10E3/UL (ref 3.4–10.8)
ZINC SERPL-MCNC: 96 UG/DL (ref 56–134)

## 2018-06-26 NOTE — PROGRESS NOTES
Your lab results have been reviewed and are as follows:    Urine Test:  Your urine analysis is normal.    Cholesterol Panel:  Excellent! Total Cholesterol is 137 (goal <200). Triglycerides are 89.  (goal <150)  Your HDL or \"good\" cholesterol is 70. (goal >40). Your LDL or \"bad\" cholesterol is 49.  (goal <100). CMP:  Fasting blood sugar is normal at 93, your hemoglobin A1C is 5.5. You do not have diabetes. Kidney function is normal.   Your electrolytes are normal.   Your liver function is normal.     Thyroid:  Your thyroid function is normal.    Vitamin D is normal. Try to get 10-15 minutes of sunlight daily without burning and try to eat foods such as low fat dairy which is Vitamin D fortified. CBC:  Your red blood cell count is normal.   Your white blood cell count is normal.   Your platelet count is normal.   You are slightly anemic and your hemoglobin is 10.7. Your iron panel is abnormal and slightly low. Make sure you are taking your nutritional supplements with iron. Your Vitamin B12 level is normal.  Your prealbumin level is normal.  Your zinc and copper levels are normal.  Your folate level is normal.  Your PTH level is normal.  Your Ferritin level is low. Hepatitis C test is negative. Zeynep Galindo, MSN, MHA, FNP-BC

## 2018-09-28 ENCOUNTER — TELEPHONE (OUTPATIENT)
Dept: FAMILY MEDICINE CLINIC | Age: 58
End: 2018-09-28

## 2018-09-28 NOTE — TELEPHONE ENCOUNTER
Started and submitted PA for Linzess 145 mcg capsules.   Awaiting approval or denial from \"Cover my Meds\"   Nothing further

## 2021-08-23 ENCOUNTER — APPOINTMENT (OUTPATIENT)
Dept: GENERAL RADIOLOGY | Age: 61
End: 2021-08-23
Attending: PHYSICIAN ASSISTANT
Payer: MEDICAID

## 2021-08-23 ENCOUNTER — HOSPITAL ENCOUNTER (EMERGENCY)
Age: 61
Discharge: HOME | End: 2021-08-23
Attending: EMERGENCY MEDICINE
Payer: MEDICAID

## 2021-08-23 VITALS
RESPIRATION RATE: 14 BRPM | OXYGEN SATURATION: 100 % | TEMPERATURE: 98.7 F | WEIGHT: 168 LBS | HEART RATE: 67 BPM | HEIGHT: 63 IN | DIASTOLIC BLOOD PRESSURE: 84 MMHG | SYSTOLIC BLOOD PRESSURE: 172 MMHG | BODY MASS INDEX: 29.77 KG/M2

## 2021-08-23 DIAGNOSIS — M79.605 LEFT LEG PAIN: Primary | ICD-10-CM

## 2021-08-23 DIAGNOSIS — S52.501A CLOSED FRACTURE OF DISTAL END OF RIGHT RADIUS, UNSPECIFIED FRACTURE MORPHOLOGY, INITIAL ENCOUNTER: ICD-10-CM

## 2021-08-23 PROCEDURE — 73590 X-RAY EXAM OF LOWER LEG: CPT

## 2021-08-23 PROCEDURE — 99282 EMERGENCY DEPT VISIT SF MDM: CPT

## 2021-08-23 PROCEDURE — 73110 X-RAY EXAM OF WRIST: CPT

## 2021-08-23 NOTE — ED PROVIDER NOTES
EMERGENCY DEPARTMENT HISTORY AND PHYSICAL EXAM      Date: 8/23/2021  Patient Name: Simone Darling    History of Presenting Illness     Chief Complaint   Patient presents with   Florrie Cancer     wants to have her left leg checked as she broke the tibia from a fall in June with surgery  in [place]; has just moved to [facility] and needs a doctor for PT;     Wrist Pain     would like her right wrist checked as well from the June accident;     Leg Pain     most recently fell in the bathroom on saturday and wants the previous injury checked of left below the knee. History Provided By: Patient    HPI: Simone Darling, 57 y.o. female with significant PMHx for asthma and HTN, presents by POV to the ED with cc of left leg and right wrist pain. Balbir Dings in June while living in [place] and had a fracture of her right wrist and left lower leg. The leg fracture required surgery. Since that time the patient has moved to [place] and has not been able to follow-up with her surgeon. She reports that she then fell on Saturday reinjuring both her leg and wrist.  She was told to be nonambulatory/weightbearing on the left leg. Thus, she would like to be reevaluated to make sure that she has not done any damage with the subsequent fall. She is currently taking oxycodone for pain. Her pain is a constant and nonradiating pain. She denies any other injuries from the fall on Saturday. She has a left knee immobilizer and right Velcro wrist splint that she has been using since her initial orthopedic evaluation. There are no other complaints, changes, or physical findings at this time. Social Hx: Tobacco (denies), EtOH (deneis), Illicit drug use (denies)     PCP: Nory Campbell NP    No current facility-administered medications on file prior to encounter.      Current Outpatient Medications on File Prior to Encounter   Medication Sig Dispense Refill    oxyCODONE-acetaminophen (PERCOCET) 5-325 mg per tablet       albuterol (VENTOLIN HFA) 90 mcg/actuation inhaler Take 2 Puffs by inhalation every four (4) hours as needed for Wheezing or Shortness of Breath. 1 Inhaler 3    loratadine (CLARITIN) 10 mg tablet Take 1 Tab by mouth daily. 30 Tab 2    levothyroxine (SYNTHROID) 25 mcg tablet Take 1 Tab by mouth Daily (before breakfast). 90 Tab 0    lisinopril (PRINIVIL, ZESTRIL) 40 mg tablet Take 1 Tab by mouth daily. 90 Tab 1    montelukast (SINGULAIR) 10 mg tablet Take 1 Tab by mouth daily. 90 Tab 0    magnesium oxide (MAG-OX) 400 mg tablet Take 1 Tab by mouth two (2) times a day. 60 Tab 2    amitriptyline (ELAVIL) 50 mg tablet Take 2 Tabs by mouth nightly. 90 Tab 3    MULTIVIT-MINERALS/FOLIC ACID (WOMENS DAILY GUMMIES PO) Take 2 Gum by mouth two (2) times a day.  CALCIUM CITRATE PO Take 1 Tab by mouth two (2) times a day.  acetaminophen (TYLENOL) 500 mg tablet Take 500 mg by mouth as needed for Pain.  [DISCONTINUED] ADVAIR DISKUS 250-50 mcg/dose diskus inhaler Take 1 Puff by inhalation two (2) times a day. 1 Inhaler 2    [DISCONTINUED] linaclotide (LINZESS) 145 mcg cap capsule Take 1 Cap by mouth daily.  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    HX CHOLECYSTECTOMY  2007   [address] AND DRAINAGE  2007    HX GASTRIC BYPASS  2014    [facility] [place]    C/S     ORTHOPAEDIC  2011    carpal tunnel right hand, with trigger finger release    PA CLOSED TX TOE FX  1984    pins placed       Family History:  Family History   Problem Relation Age of Onset  Diabetes Father     Asthma Father     Hypertension Father     Hypertension Sister     Hypertension Brother        Social History:  Social History     Tobacco Use    Smoking status: Never Smoker    Smokeless tobacco: Never Used   Substance Use Topics    Alcohol use: No    Drug use: No       Allergies: Allergies   Allergen Reactions    Daypro [Oxaprozin] Swelling         Review of Systems   Review of Systems   Constitutional: Negative for chills, diaphoresis and fever. HENT: Negative for congestion, ear pain, rhinorrhea and sore throat. Respiratory: Negative for cough and shortness of breath. Cardiovascular: Negative for chest pain. Gastrointestinal: Negative for abdominal pain, constipation, diarrhea, nausea and vomiting. Genitourinary: Negative for difficulty urinating, dysuria, frequency and hematuria. Musculoskeletal: Positive for arthralgias and gait problem. Negative for myalgias. Neurological: Negative for headaches. All other systems reviewed and are negative. Physical Exam   Physical Exam  Vitals and nursing note reviewed. Constitutional:       General: She is not in acute distress. Appearance: She is well-developed. She is not diaphoretic. Comments: 57 y.o. American female    HENT:      Head: Normocephalic and atraumatic. Eyes:      General:         Right eye: No discharge. Left eye: No discharge. Conjunctiva/sclera: Conjunctivae normal.   Cardiovascular:      Rate and Rhythm: Normal rate and regular rhythm. Pulses: Normal pulses. Pulmonary:      Effort: Pulmonary effort is normal.   Musculoskeletal:      Cervical back: Normal range of motion and neck supple. Comments: L LEG: Well healing surgical scar with slight tenderness. No signs of infection. No ecchymosis or deformity. R WRIST: No swelling, ecchymosis, deformity, or tenderness palpation. Full range of motion of the fingers and the wrist.    Distal neurovascular status intact. Cap refill less than 2 seconds. Unable to ambulate. Skin:     General: Skin is warm and dry. Neurological:      Mental Status: She is alert and oriented to person, place, and time. Psychiatric:         Behavior: Behavior normal.         Diagnostic Study Results     Labs - none    Radiologic Studies -   XR WRIST RT AP/LAT/OBL MIN 3V   Final Result      Subtle nondisplaced impaction fracture of the distal radius is most likely   extra-articular. XR TIB/FIB LT   Final Result      No acute fracture or hardware complication. Surgically fixated nonacute proximal   tibia fracture has some degree of healing. The above xray(s) have been interpreted independently by me and I agree with the radiologists findings above. Medical Decision Making   I am the first provider for this patient. I reviewed the vital signs, available nursing notes, past medical history, past surgical history, family history and social history. Vital Signs-Reviewed the patient's vital signs. Patient Vitals for the past 12 hrs:   Temp Pulse Resp BP SpO2   08/23/21 1111 98.7 °F (37.1 °C) 67 14 (!) 172/84 100 %       Records Reviewed: Nursing Notes    Provider Notes (Medical Decision Making):   Presents the ED with orthopedic complaints. Vitals are stable. Differential diagnosis include fracture, sprain, strain, orthopedic failure, arthritis. X-rays show a fracture to the wrist.  The patient already has a wrist immobilizer for this. She has been encouraged to wear it. X-rays of the leg are without acute new issues. She should continue to use her knee immobilizer. She should follow-up with orthopedics for both complaints. She should keep taking her pain medication. Can return to the ED for deterioration. ED Course:   Initial assessment performed. The patients presenting problems have been discussed, and they are in agreement with the care plan formulated and outlined with them.   I have encouraged them to ask questions as they arise throughout their visit. Critical Care Time: None    Disposition:  DISCHARGE NOTE:  12:49 PM  The pt is ready for discharge. The pt's signs, symptoms, diagnosis, and discharge instructions have been discussed and pt has conveyed their understanding. The pt is to follow up as recommended or return to ER should their symptoms worsen. Plan has been discussed and pt is in agreement. PLAN:  1. Current Discharge Medication List        2. Follow-up Information     Follow up With Specialties Details Why Contact Info    Tricia Osman MD Orthopedic Surgery In 1 week  [address] [facility]  [address] [facility] [address]  [address] [address] [phone]          Return to ED if worse     Diagnosis     Clinical Impression:   1. Left leg pain    2. Closed fracture of distal end of right radius, unspecified fracture morphology, initial encounter          Please note that this dictation was completed with [employer], the computer voice recognition software. Quite often unanticipated grammatical, syntax, homophones, and other interpretive errors are inadvertently transcribed by the computer software. Please disregards these errors. Please excuse any errors that have escaped final proofreading.

## 2021-08-23 NOTE — DISCHARGE INSTRUCTIONS
It was a pleasure taking care of you at [facility] Emergency Department today. We know that when you come to [facility], you are entrusting us with your health, comfort, and safety. Our physicians and nurses honor that trust, and we truly appreciate the opportunity to care for you and your loved ones. We also value your feedback. If you receive a survey about your Emergency Department experience today, please fill it out. We care about our patients' feedback, and we listen to what you have to say. Thank you!

## 2021-11-18 ENCOUNTER — OFFICE VISIT (OUTPATIENT)
Dept: FAMILY MEDICINE CLINIC | Age: 61
End: 2021-11-18
Payer: MEDICAID

## 2021-11-18 VITALS
OXYGEN SATURATION: 99 % | BODY MASS INDEX: 29.7 KG/M2 | HEART RATE: 60 BPM | SYSTOLIC BLOOD PRESSURE: 151 MMHG | DIASTOLIC BLOOD PRESSURE: 79 MMHG | HEIGHT: 63 IN | TEMPERATURE: 98.6 F | RESPIRATION RATE: 17 BRPM | WEIGHT: 167.6 LBS

## 2021-11-18 DIAGNOSIS — Z12.31 SCREENING MAMMOGRAM FOR BREAST CANCER: ICD-10-CM

## 2021-11-18 DIAGNOSIS — S61.209A AVULSION OF FINGER, INITIAL ENCOUNTER: ICD-10-CM

## 2021-11-18 DIAGNOSIS — Z98.84 BARIATRIC SURGERY STATUS: ICD-10-CM

## 2021-11-18 DIAGNOSIS — J45.20 MILD INTERMITTENT ASTHMA, UNSPECIFIED WHETHER COMPLICATED: ICD-10-CM

## 2021-11-18 DIAGNOSIS — K58.0 IRRITABLE BOWEL SYNDROME WITH DIARRHEA: Primary | ICD-10-CM

## 2021-11-18 DIAGNOSIS — I10 ESSENTIAL HYPERTENSION: ICD-10-CM

## 2021-11-18 DIAGNOSIS — E03.9 HYPOTHYROIDISM, UNSPECIFIED TYPE: Chronic | ICD-10-CM

## 2021-11-18 PROCEDURE — 99204 OFFICE O/P NEW MOD 45 MIN: CPT | Performed by: FAMILY MEDICINE

## 2021-11-18 RX ORDER — LORATADINE 10 MG/1
10 TABLET ORAL DAILY
Qty: 90 TABLET | Refills: 3 | Status: SHIPPED | OUTPATIENT
Start: 2021-11-18 | End: 2021-12-08 | Stop reason: SDUPTHER

## 2021-11-18 RX ORDER — LEVOTHYROXINE SODIUM 25 UG/1
25 TABLET ORAL
Qty: 90 TABLET | Refills: 3 | Status: SHIPPED | OUTPATIENT
Start: 2021-11-18 | End: 2022-10-13

## 2021-11-18 RX ORDER — LANOLIN ALCOHOL/MO/W.PET/CERES
400 CREAM (GRAM) TOPICAL 2 TIMES DAILY
Qty: 180 TABLET | Refills: 3 | Status: SHIPPED | OUTPATIENT
Start: 2021-11-18

## 2021-11-18 RX ORDER — AMITRIPTYLINE HYDROCHLORIDE 50 MG/1
100 TABLET, FILM COATED ORAL
Qty: 90 TABLET | Refills: 3 | Status: SHIPPED | OUTPATIENT
Start: 2021-11-18 | End: 2022-04-22

## 2021-11-18 RX ORDER — LISINOPRIL 40 MG/1
40 TABLET ORAL DAILY
Qty: 90 TABLET | Refills: 3 | Status: SHIPPED | OUTPATIENT
Start: 2021-11-18

## 2021-11-18 RX ORDER — MONTELUKAST SODIUM 10 MG/1
10 TABLET ORAL DAILY
Qty: 90 TABLET | Refills: 3 | Status: SHIPPED | OUTPATIENT
Start: 2021-11-18 | End: 2022-10-13

## 2021-11-18 NOTE — PROGRESS NOTES
1. Have you been to the ER, urgent care clinic since your last visit? Hospitalized since your last visit? Yes, [facility] in [place] for broken finger. 10/29/2021    2. Have you seen or consulted any other health care providers outside of the [address] [place] [address] since your last visit? Include any pap smears or colon screening. No    Health Maintenance Due   Topic Date Due    COVID-19 Vaccine (1) Never done    DTaP/Tdap/Td series (1 - Tdap) Never done    Cervical cancer screen  Never done    Colorectal Cancer Screening Combo  Never done    Shingrix Vaccine Age 50> (1 of 2) Never done    Breast Cancer Screen Mammogram  Never done    Flu Vaccine (1) 09/01/2021     Chief Complaint   Patient presents with   [place] would like to discuss Orthopedics for her wrist and hands. Pt also wanting flu shot.

## 2021-11-18 NOTE — PROGRESS NOTES
Chief Complaint   Patient presents with   [facility] Care     Pt recently moved back to the area. Pt needs an orthopedic hand surgeon, pt recent broke her finger, still has right wrist pain fom the same injury. Pt is due for a mammogram.     Pt has been getting hip injections. Pt is not fasting today, will come back for labs. Subjective: (As above and below)     Chief Complaint   Patient presents with   [address]     she is a 57y.o. year old female who presents for evaluation. Reviewed PmHx, RxHx, FmHx, SocHx, AllgHx and updated in chart. Review of Systems - negative except as listed above    Objective:     Vitals:    11/18/21 0852 11/18/21 0910   BP: (!) 150/83 (!) 151/79   Pulse: 60    Resp: 17    Temp: 98.6 °F (37 °C)    TempSrc: Oral    SpO2: 99%    Weight: 167 lb 9.6 oz (76 kg)    Height: 5' 3\" (1.6 m)      Physical Examination: General appearance - alert, well appearing, and in no distress  Mental status - normal mood, behavior, speech, dress, motor activity, and thought processes  Ears - bilateral TM's and external ear canals normal  Chest - clear to auscultation, no wheezes, rales or rhonchi, symmetric air entry  Heart - normal rate, regular rhythm, normal S1, S2, no murmurs, rubs, clicks or gallops  Musculoskeletal - brace on right wrist, splint on right 5th finger  Extremities - peripheral pulses normal, no pedal edema, no clubbing or cyanosis    Assessment/ Plan:   1. Essential hypertension  -elevated in office today, pt has been out of medication  -resume as written  - amitriptyline (ELAVIL) 50 mg tablet; Take 2 Tablets by mouth nightly. Dispense: 90 Tablet; Refill: 3  - lisinopriL (PRINIVIL, ZESTRIL) 40 mg tablet; Take 1 Tablet by mouth daily. Dispense: 90 Tablet; Refill: 3  - METABOLIC PANEL, COMPREHENSIVE; Future  - CBC W/O DIFF; Future  - LIPID PANEL; Future  - HEMOGLOBIN A1C WITH EAG;  Future  - METABOLIC PANEL, COMPREHENSIVE  - CBC W/O DIFF  - LIPID PANEL  - HEMOGLOBIN A1C WITH EAG    2. Bariatric surgery status  -check  labs  - magnesium oxide (MAG-OX) 400 mg tablet; Take 1 Tablet by mouth two (2) times a day. Dispense: 180 Tablet; Refill: 3  - VITAMIN D, 25 HYDROXY; Future  - VITAMIN D, 25 HYDROXY    3. Hypothyroidism, unspecified type  - levothyroxine (SYNTHROID) 25 mcg tablet; Take 1 Tablet by mouth Daily (before breakfast). Dispense: 90 Tablet; Refill: 3  - TSH 3RD GENERATION; Future  - TSH 3RD GENERATION    4. Mild intermittent asthma, unspecified whether complicated  - montelukast (SINGULAIR) 10 mg tablet; Take 1 Tablet by mouth daily. Dispense: 90 Tablet; Refill: 3  - loratadine (CLARITIN) 10 mg tablet; Take 1 Tablet by mouth daily. Dispense: 90 Tablet; Refill: 3    5. Irritable bowel syndrome with diarrhea  -resume medication that she was previously on  - linaCLOtide (Linzess) 145 mcg cap capsule; Take 1 Capsule by mouth Daily (before breakfast). Dispense: 90 Capsule; Refill: 3    6. Screening mammogram for breast cancer  - [facility] MAMMO BI SCREENING INCL CAD; Future    7. Avulsion of finger, initial encounter  - REFERRAL TO ORTHOPEDICS       I have discussed the diagnosis with the patient and the intended plan as seen in the above orders. The patient has received an after-visit summary and questions were answered concerning future plans.      Medication Side Effects and Warnings were discussed with patient: yes  Patient Labs were reviewed: yes  Patient Past Records were reviewed:  yes    Ad Hastings M.D.

## 2021-11-19 LAB
25(OH)D3+25(OH)D2 SERPL-MCNC: 37.3 NG/ML (ref 30–100)
ALBUMIN SERPL-MCNC: 4 G/DL (ref 3.8–4.8)
ALBUMIN/GLOB SERPL: 2 {RATIO} (ref 1.2–2.2)
ALP SERPL-CCNC: 87 IU/L (ref 44–121)
ALT SERPL-CCNC: 10 IU/L (ref 0–32)
AST SERPL-CCNC: 17 IU/L (ref 0–40)
BILIRUB SERPL-MCNC: 0.3 MG/DL (ref 0–1.2)
BUN SERPL-MCNC: 13 MG/DL (ref 8–27)
BUN/CREAT SERPL: 19 (ref 12–28)
CALCIUM SERPL-MCNC: 8.8 MG/DL (ref 8.7–10.3)
CHLORIDE SERPL-SCNC: 104 MMOL/L (ref 96–106)
CHOLEST SERPL-MCNC: 120 MG/DL (ref 100–199)
CO2 SERPL-SCNC: 23 MMOL/L (ref 20–29)
CREAT SERPL-MCNC: 0.67 MG/DL (ref 0.57–1)
ERYTHROCYTE [DISTWIDTH] IN BLOOD BY AUTOMATED COUNT: 12.7 % (ref 11.7–15.4)
EST. AVERAGE GLUCOSE BLD GHB EST-MCNC: 120 MG/DL
GLOBULIN SER CALC-MCNC: 2 G/DL (ref 1.5–4.5)
GLUCOSE SERPL-MCNC: 93 MG/DL (ref 65–99)
HBA1C MFR BLD: 5.8 % (ref 4.8–5.6)
HCT VFR BLD AUTO: 34.4 % (ref 34–46.6)
HDLC SERPL-MCNC: 63 MG/DL
HGB BLD-MCNC: 10.9 G/DL (ref 11.1–15.9)
IMP & REVIEW OF LAB RESULTS: NORMAL
LDLC SERPL CALC-MCNC: 46 MG/DL (ref 0–99)
MCH RBC QN AUTO: 25.8 PG (ref 26.6–33)
MCHC RBC AUTO-ENTMCNC: 31.7 G/DL (ref 31.5–35.7)
MCV RBC AUTO: 82 FL (ref 79–97)
PLATELET # BLD AUTO: 246 X10E3/UL (ref 150–450)
POTASSIUM SERPL-SCNC: 4.2 MMOL/L (ref 3.5–5.2)
PROT SERPL-MCNC: 6 G/DL (ref 6–8.5)
RBC # BLD AUTO: 4.22 X10E6/UL (ref 3.77–5.28)
SODIUM SERPL-SCNC: 142 MMOL/L (ref 134–144)
TRIGL SERPL-MCNC: 45 MG/DL (ref 0–149)
TSH SERPL DL<=0.005 MIU/L-ACNC: 2.27 UIU/ML (ref 0.45–4.5)
VLDLC SERPL CALC-MCNC: 11 MG/DL (ref 5–40)
WBC # BLD AUTO: 5.3 X10E3/UL (ref 3.4–10.8)

## 2021-11-20 NOTE — PROGRESS NOTES
Hemoglobin slightly low, but stable  Increase in risk for diabetes, please closely monitor diet and increase exercise   All other labs are within normal limits.    Please inform

## 2021-12-04 ENCOUNTER — TELEPHONE (OUTPATIENT)
Dept: FAMILY MEDICINE CLINIC | Age: 61
End: 2021-12-04

## 2021-12-04 NOTE — TELEPHONE ENCOUNTER
Joey LUTZ Case ID: [id number]   Rx #: [id number]  Outcome Approved today  PA Case: [id number]  Status: Approved  Coverage Starts on: 12/4/2021 12:00:00 AM, Coverage Ends on: 6/2/2022 12:00:00 AM.  Drug Linzess 145MCG capsules  [place] Medicaid Electronic PA Form    Devora ([employer]) notified of approval.

## 2021-12-08 ENCOUNTER — TELEPHONE (OUTPATIENT)
Dept: FAMILY MEDICINE CLINIC | Age: 61
End: 2021-12-08

## 2021-12-08 DIAGNOSIS — I10 ESSENTIAL HYPERTENSION: ICD-10-CM

## 2021-12-08 DIAGNOSIS — D64.9 ANEMIA, UNSPECIFIED TYPE: ICD-10-CM

## 2021-12-08 DIAGNOSIS — Z98.84 S/P BARIATRIC SURGERY: ICD-10-CM

## 2021-12-08 DIAGNOSIS — R53.83 FATIGUE, UNSPECIFIED TYPE: ICD-10-CM

## 2021-12-08 DIAGNOSIS — J45.20 MILD INTERMITTENT ASTHMA, UNSPECIFIED WHETHER COMPLICATED: ICD-10-CM

## 2021-12-08 DIAGNOSIS — E44.1 MILD PROTEIN-CALORIE MALNUTRITION (HCC): ICD-10-CM

## 2021-12-08 DIAGNOSIS — Z98.84 BARIATRIC SURGERY STATUS: ICD-10-CM

## 2021-12-08 DIAGNOSIS — Z87.898 HISTORY OF PREDIABETES: ICD-10-CM

## 2021-12-08 DIAGNOSIS — E66.3 OVERWEIGHT (BMI 25.0-29.9): ICD-10-CM

## 2021-12-08 RX ORDER — LORATADINE 10 MG/1
10 TABLET ORAL DAILY
Qty: 90 TABLET | Refills: 3 | Status: SHIPPED | OUTPATIENT
Start: 2021-12-08

## 2021-12-08 RX ORDER — ALBUTEROL SULFATE 90 UG/1
2 AEROSOL, METERED RESPIRATORY (INHALATION)
Qty: 1 EACH | Refills: 5 | Status: SHIPPED | OUTPATIENT
Start: 2021-12-08 | End: 2022-03-24

## 2021-12-08 NOTE — TELEPHONE ENCOUNTER
Refill Request (patient called)    Requested Prescriptions     Pending Prescriptions Disp Refills    albuterol (Ventolin HFA) 90 mcg/actuation inhaler       Sig: Take 2 Puffs by inhalation every four (4) hours as needed for Wheezing or Shortness of Breath.  loratadine (CLARITIN) 10 mg tablet 90 Tablet 3     Sig: Take 1 Tablet by mouth daily.      Please call pt when RX is sent over     Thank You

## 2021-12-08 NOTE — TELEPHONE ENCOUNTER
Monica Estevez   Douglas: [id number]   PA Case ID: [id number]  Status Sent to Plan today  Drug Albuterol Sulfate  (90 Base) MCG/ACT aerosol  [facility] Electronic PA Form

## 2021-12-09 NOTE — TELEPHONE ENCOUNTER
Julianna Shearer   Douglas: [id number]   PA Case ID: [id number]  Outcome Approved on December 8  PA Case: [id number]  Status: Approved  Coverage Starts on: 12/8/2021 12:00:00 AM, Coverage Ends on: 12/8/2022 12:00:00 AM.  Drug Albuterol Sulfate  (90 Base) MCG/ACT aerosol    Bobby Rhodes (pharmacist) notified and pt had picked up her Rx.

## 2022-03-18 PROBLEM — E03.9 HYPOTHYROID: Status: ACTIVE | Noted: 2018-03-15

## 2022-03-19 PROBLEM — M25.551 HIP PAIN, BILATERAL: Status: ACTIVE | Noted: 2018-03-15

## 2022-03-19 PROBLEM — M25.552 HIP PAIN, BILATERAL: Status: ACTIVE | Noted: 2018-03-15

## 2022-03-19 PROBLEM — Z98.84 BARIATRIC SURGERY STATUS: Status: ACTIVE | Noted: 2018-03-15

## 2022-03-19 PROBLEM — K59.00 CONSTIPATION: Status: ACTIVE | Noted: 2018-03-15

## 2022-03-20 PROBLEM — K58.0 IRRITABLE BOWEL SYNDROME WITH DIARRHEA: Status: ACTIVE | Noted: 2021-11-18

## 2022-03-24 DIAGNOSIS — I10 ESSENTIAL HYPERTENSION: ICD-10-CM

## 2022-03-24 DIAGNOSIS — Z87.898 HISTORY OF PREDIABETES: ICD-10-CM

## 2022-03-24 DIAGNOSIS — E44.1 MILD PROTEIN-CALORIE MALNUTRITION (HCC): ICD-10-CM

## 2022-03-24 DIAGNOSIS — D64.9 ANEMIA, UNSPECIFIED TYPE: ICD-10-CM

## 2022-03-24 DIAGNOSIS — J45.20 MILD INTERMITTENT ASTHMA, UNSPECIFIED WHETHER COMPLICATED: ICD-10-CM

## 2022-03-24 DIAGNOSIS — E66.3 OVERWEIGHT (BMI 25.0-29.9): ICD-10-CM

## 2022-03-24 DIAGNOSIS — Z98.84 BARIATRIC SURGERY STATUS: ICD-10-CM

## 2022-03-24 DIAGNOSIS — R53.83 FATIGUE, UNSPECIFIED TYPE: ICD-10-CM

## 2022-03-24 DIAGNOSIS — Z98.84 S/P BARIATRIC SURGERY: ICD-10-CM

## 2022-03-24 RX ORDER — ALBUTEROL SULFATE 90 UG/1
AEROSOL, METERED RESPIRATORY (INHALATION)
Qty: 18 G | Refills: 0 | Status: SHIPPED | OUTPATIENT
Start: 2022-03-24 | End: 2022-04-22

## 2022-04-22 DIAGNOSIS — I10 ESSENTIAL HYPERTENSION: ICD-10-CM

## 2022-04-22 DIAGNOSIS — Z98.84 S/P BARIATRIC SURGERY: ICD-10-CM

## 2022-04-22 DIAGNOSIS — R53.83 FATIGUE, UNSPECIFIED TYPE: ICD-10-CM

## 2022-04-22 DIAGNOSIS — D64.9 ANEMIA, UNSPECIFIED TYPE: ICD-10-CM

## 2022-04-22 DIAGNOSIS — Z98.84 BARIATRIC SURGERY STATUS: ICD-10-CM

## 2022-04-22 DIAGNOSIS — Z87.898 HISTORY OF PREDIABETES: ICD-10-CM

## 2022-04-22 DIAGNOSIS — J45.20 MILD INTERMITTENT ASTHMA, UNSPECIFIED WHETHER COMPLICATED: ICD-10-CM

## 2022-04-22 DIAGNOSIS — E66.3 OVERWEIGHT (BMI 25.0-29.9): ICD-10-CM

## 2022-04-22 DIAGNOSIS — E44.1 MILD PROTEIN-CALORIE MALNUTRITION (HCC): ICD-10-CM

## 2022-04-22 RX ORDER — AMITRIPTYLINE HYDROCHLORIDE 50 MG/1
100 TABLET, FILM COATED ORAL
Qty: 60 TABLET | Refills: 0 | Status: SHIPPED | OUTPATIENT
Start: 2022-04-22 | End: 2022-05-16 | Stop reason: SDUPTHER

## 2022-04-22 RX ORDER — ALBUTEROL SULFATE 90 UG/1
AEROSOL, METERED RESPIRATORY (INHALATION)
Qty: 18 G | Refills: 0 | Status: SHIPPED | OUTPATIENT
Start: 2022-04-22 | End: 2022-07-21

## 2022-05-05 ENCOUNTER — DOCUMENTATION ONLY (OUTPATIENT)
Dept: INTERNAL MEDICINE CLINIC | Age: 62
End: 2022-05-05

## 2022-05-05 NOTE — PROGRESS NOTES
5/5/22 Electronic PA noted. Key: [id number] help? Call us at [phone]  Outcome: The member recently filled this medication and will be able to return for their next refill according to their plan limits.   Drug  Linzess 145MCG capsules  Form  [employer] Electronic PA Form ([id number] NCPDP)

## 2022-05-16 ENCOUNTER — OFFICE VISIT (OUTPATIENT)
Dept: FAMILY MEDICINE CLINIC | Age: 62
End: 2022-05-16
Payer: MEDICAID

## 2022-05-16 VITALS
WEIGHT: 166.2 LBS | SYSTOLIC BLOOD PRESSURE: 96 MMHG | TEMPERATURE: 98.1 F | BODY MASS INDEX: 29.45 KG/M2 | HEIGHT: 63 IN | OXYGEN SATURATION: 98 % | RESPIRATION RATE: 16 BRPM | HEART RATE: 64 BPM | DIASTOLIC BLOOD PRESSURE: 60 MMHG

## 2022-05-16 DIAGNOSIS — Z12.31 VISIT FOR SCREENING MAMMOGRAM: Primary | ICD-10-CM

## 2022-05-16 DIAGNOSIS — L81.1 MELASMA: ICD-10-CM

## 2022-05-16 DIAGNOSIS — Z12.11 SCREEN FOR COLON CANCER: ICD-10-CM

## 2022-05-16 DIAGNOSIS — I10 ESSENTIAL HYPERTENSION: ICD-10-CM

## 2022-05-16 DIAGNOSIS — E03.9 HYPOTHYROIDISM, UNSPECIFIED TYPE: ICD-10-CM

## 2022-05-16 DIAGNOSIS — Z00.00 ROUTINE GENERAL MEDICAL EXAMINATION AT A HEALTH CARE FACILITY: ICD-10-CM

## 2022-05-16 DIAGNOSIS — Z98.84 BARIATRIC SURGERY STATUS: ICD-10-CM

## 2022-05-16 PROCEDURE — 99396 PREV VISIT EST AGE 40-64: CPT | Performed by: FAMILY MEDICINE

## 2022-05-16 RX ORDER — AMITRIPTYLINE HYDROCHLORIDE 50 MG/1
100 TABLET, FILM COATED ORAL
Qty: 180 TABLET | Refills: 3 | Status: SHIPPED | OUTPATIENT
Start: 2022-05-16 | End: 2022-07-21

## 2022-05-16 NOTE — PROGRESS NOTES
Chief Complaint   Patient presents with    Annual Wellness Visit     Pt has been doing well. She reports that she has been feeling well. Subjective: (As above and below)     Chief Complaint   Patient presents with    Annual Wellness Visit     she is a 60y.o. year old female who presents for evaluation. Reviewed PmHx, RxHx, FmHx, SocHx, AllgHx and updated in chart. Review of Systems - negative except as listed above    Objective:     Vitals:    05/16/22 0748   BP: 96/60   Pulse: 64   Resp: 16   Temp: 98.1 °F (36.7 °C)   TempSrc: Temporal   SpO2: 98%   Weight: 166 lb 3.2 oz (75.4 kg)   Height: 5' 3\" (1.6 m)     Physical Examination: General appearance - alert, well appearing, and in no distress  Mental status - normal mood, behavior, speech, dress, motor activity, and thought processes  Ears - bilateral TM's and external ear canals normal  Chest - clear to auscultation, no wheezes, rales or rhonchi, symmetric air entry  Heart - normal rate, regular rhythm, normal S1, S2, no murmurs, rubs, clicks or gallops  Musculoskeletal - no joint tenderness, deformity or swelling  Extremities - peripheral pulses normal, no pedal edema, no clubbing or cyanosis    Assessment/ Plan:   1. Visit for screening mammogram  - [facility] MAMMO BI SCREENING INCL CAD; Future    2. Screen for colon cancer  - REFERRAL TO GASTROENTEROLOGY    3. Essential hypertension  -well controlled  - METABOLIC PANEL, COMPREHENSIVE; Future  - CBC W/O DIFF; Future  - LIPID PANEL; Future  - HEMOGLOBIN A1C WITH EAG; Future  - VITAMIN D, 25 HYDROXY; Future  - METABOLIC PANEL, COMPREHENSIVE  - CBC W/O DIFF  - LIPID PANEL  - HEMOGLOBIN A1C WITH EAG  - VITAMIN D, 25 HYDROXY  - amitriptyline (ELAVIL) 50 mg tablet; Take 2 Tablets by mouth nightly. Dispense: 180 Tablet; Refill: 3    4. Bariatric surgery status  -doing well    5. Melasma  -mild, reviewed treatment options    6. Hypothyroidism, unspecified type  - TSH 3RD GENERATION;  Future  - TSH 3RD GENERATION     7. Routine general medical examination at a [facility] facility  -doing well, scheduled for PAP    I have discussed the diagnosis with the patient and the intended plan as seen in the above orders. The patient has received an after-visit summary and questions were answered concerning future plans.      Medication Side Effects and Warnings were discussed with patient: yes  Patient Labs were reviewed: yes  Patient Past Records were reviewed:  yes    Hiren Montalvo M.D.

## 2022-05-16 NOTE — PROGRESS NOTES
Health Maintenance Due   Topic Date Due    DTaP/Tdap/Td series (1 - Tdap) Never done    Cervical cancer screen  Never done    Colorectal Cancer Screening Combo  Never done    Shingrix Vaccine Age 50> (1 of 2) Never done    Breast Cancer Screen Mammogram  Never done    Pneumococcal 0-64 years (2 - PCV) 04/25/2020    COVID-19 Vaccine (3 - Booster for Moderna series) 10/19/2021     1. \"Have you been to the ER, urgent care clinic since your last visit? Hospitalized since your last visit? \" No    2. \"Have you seen or consulted any other health care providers outside of the [address] [place] [address] since your last visit? \" No     3. For patients aged 39-70: Has the patient had a colonoscopy / FIT/ Cologuard? No      If the patient is female:    4. For patients aged 41-77: Has the patient had a mammogram within the past 2 years? No      5. For patients aged 21-65: Has the patient had a pap smear? No     Do you have an [address] in place in the event that you have a healthcare crisis that could impact your decision making as it pertains to your health? NO    Would you like information about Advance Care Planning? NO    Information given.  NO

## 2022-05-17 LAB
25(OH)D3+25(OH)D2 SERPL-MCNC: 38.8 NG/ML (ref 30–100)
ALBUMIN SERPL-MCNC: 4.1 G/DL (ref 3.8–4.8)
ALBUMIN/GLOB SERPL: 1.9 {RATIO} (ref 1.2–2.2)
ALP SERPL-CCNC: 85 IU/L (ref 44–121)
ALT SERPL-CCNC: 14 IU/L (ref 0–32)
AST SERPL-CCNC: 15 IU/L (ref 0–40)
BILIRUB SERPL-MCNC: <0.2 MG/DL (ref 0–1.2)
BUN SERPL-MCNC: 18 MG/DL (ref 8–27)
BUN/CREAT SERPL: 24 (ref 12–28)
CALCIUM SERPL-MCNC: 9.2 MG/DL (ref 8.7–10.3)
CHLORIDE SERPL-SCNC: 104 MMOL/L (ref 96–106)
CHOLEST SERPL-MCNC: 123 MG/DL (ref 100–199)
CO2 SERPL-SCNC: 26 MMOL/L (ref 20–29)
CREAT SERPL-MCNC: 0.74 MG/DL (ref 0.57–1)
EGFR: 91 ML/MIN/1.73
ERYTHROCYTE [DISTWIDTH] IN BLOOD BY AUTOMATED COUNT: 14.4 % (ref 11.7–15.4)
EST. AVERAGE GLUCOSE BLD GHB EST-MCNC: 120 MG/DL
GLOBULIN SER CALC-MCNC: 2.2 G/DL (ref 1.5–4.5)
GLUCOSE SERPL-MCNC: 97 MG/DL (ref 65–99)
HBA1C MFR BLD: 5.8 % (ref 4.8–5.6)
HCT VFR BLD AUTO: 34.4 % (ref 34–46.6)
HDLC SERPL-MCNC: 65 MG/DL
HGB BLD-MCNC: 10.4 G/DL (ref 11.1–15.9)
IMP & REVIEW OF LAB RESULTS: NORMAL
LDLC SERPL CALC-MCNC: 44 MG/DL (ref 0–99)
MCH RBC QN AUTO: 23.1 PG (ref 26.6–33)
MCHC RBC AUTO-ENTMCNC: 30.2 G/DL (ref 31.5–35.7)
MCV RBC AUTO: 76 FL (ref 79–97)
PLATELET # BLD AUTO: 280 X10E3/UL (ref 150–450)
POTASSIUM SERPL-SCNC: 4.5 MMOL/L (ref 3.5–5.2)
PROT SERPL-MCNC: 6.3 G/DL (ref 6–8.5)
RBC # BLD AUTO: 4.51 X10E6/UL (ref 3.77–5.28)
SODIUM SERPL-SCNC: 142 MMOL/L (ref 134–144)
TRIGL SERPL-MCNC: 70 MG/DL (ref 0–149)
TSH SERPL DL<=0.005 MIU/L-ACNC: 3.49 UIU/ML (ref 0.45–4.5)
VLDLC SERPL CALC-MCNC: 14 MG/DL (ref 5–40)
WBC # BLD AUTO: 6 X10E3/UL (ref 3.4–10.8)

## 2022-05-20 NOTE — PROGRESS NOTES
Hemoglobin has dropped slightly, but stable  Increase in risk for diabetes, please closely monitor diet and increase exercise   All other labs are within normal limits. Please inform  Recheck in 3 months.

## 2022-05-25 ENCOUNTER — OFFICE VISIT (OUTPATIENT)
Dept: FAMILY MEDICINE CLINIC | Age: 62
End: 2022-05-25
Payer: MEDICAID

## 2022-05-25 VITALS
SYSTOLIC BLOOD PRESSURE: 107 MMHG | WEIGHT: 165.2 LBS | BODY MASS INDEX: 29.27 KG/M2 | DIASTOLIC BLOOD PRESSURE: 67 MMHG | HEIGHT: 63 IN | OXYGEN SATURATION: 99 % | HEART RATE: 62 BPM | TEMPERATURE: 97.5 F | RESPIRATION RATE: 16 BRPM

## 2022-05-25 DIAGNOSIS — Z01.419 WELL WOMAN EXAM WITH ROUTINE GYNECOLOGICAL EXAM: Primary | ICD-10-CM

## 2022-05-25 PROCEDURE — 99396 PREV VISIT EST AGE 40-64: CPT | Performed by: FAMILY MEDICINE

## 2022-05-25 NOTE — PROGRESS NOTES
Health Maintenance Due   Topic Date Due    Cervical cancer screen  Never done    Colorectal Cancer Screening Combo  Never done    Breast Cancer Screen Mammogram  Never done    COVID-19 Vaccine (3 - Booster for Moderna series) 10/19/2021     1. \"Have you been to the ER, urgent care clinic since your last visit? Hospitalized since your last visit? \" No    2. \"Have you seen or consulted any other health care providers outside of the [address] [place] [address] since your last visit? \" No     3. For patients aged 39-70: Has the patient had a colonoscopy / FIT/ Cologuard? No      If the patient is female:    4. For patients aged 41-77: Has the patient had a mammogram within the past 2 years? No      5. For patients aged 21-65: Has the patient had a pap smear?  No

## 2022-05-25 NOTE — PROGRESS NOTES
Subjective:   60 y.o. female for Well Woman Check. No LMP recorded (lmp unknown). Patient is postmenopausal.    Social History: not sexually active. Pertinent past medical hstory:   Past Medical History:   Diagnosis Date    Asthma     resolved    Dental disorder NEC     Diabetes ([facility]     resolved    Dyspepsia and other specified disorders of function of stomach     Hypertension     resolved    Hypothyroid     Morbid obesity ([facility] 5/22/2012    Palpitation        Allergies   Allergen Reactions    Daypro [Oxaprozin] Swelling        ROS:  Feeling well. No dyspnea or chest pain on exertion. No abdominal pain, change in bowel habits, black or bloody stools. No urinary tract symptoms. GYN ROS: no breast pain or new or enlarging lumps on self exam. No neurological complaints. Objective:     Visit Vitals  /67 (BP 1 Location: Right upper arm, BP Patient Position: Sitting, BP Cuff Size: Large adult)   Pulse 62   Temp 97.5 °F (36.4 °C) (Temporal)   Resp 16   Ht 5' 3\" (1.6 m)   Wt 165 lb 3.2 oz (74.9 kg)   LMP  (LMP Unknown)   SpO2 99%   BMI 29.26 kg/m²     The patient appears well, alert, oriented x 3, in no distress. ENT normal.  Neck supple. No adenopathy or thyromegaly. LUCIAN. Lungs are clear, good air entry, no wheezes, rhonchi or rales. S1 and S2 normal, no murmurs, regular rate and rhythm. Abdomen soft without tenderness, guarding, mass or organomegaly. Extremities show no edema, normal peripheral pulses. Neurological is normal, no focal findings. Breast exam declined, pt plans to schedule mammogram.     PELVIC EXAM: normal external genitalia, vulva, vagina, cervix, uterus and adnexa    Assessment/Plan:   well woman  pap smear  return annually or prn    ICD-10-CM ICD-9-CM    1. Well woman exam with routine gynecological exam  Z01.419 V72.31 PAP (IMAGE GUIDED), LIQUID-BASED    [V72.31]     Encounter Diagnoses   Name Primary?     Well woman exam with routine gynecological exam Yes    Comment: [V72.31]     Orders Placed This Encounter    PAP (IMAGE GUIDED), LIQUID-BASED   .

## 2022-06-01 LAB
CYTOLOGIST CVX/VAG CYTO: ABNORMAL
CYTOLOGY CVX/VAG DOC CYTO: ABNORMAL
CYTOLOGY CVX/VAG DOC THIN PREP: ABNORMAL
DX ICD CODE: ABNORMAL
DX ICD CODE: ABNORMAL
Lab: ABNORMAL
OTHER STN SPEC: ABNORMAL
PATHOLOGIST CVX/VAG CYTO: ABNORMAL
STAT OF ADQ CVX/VAG CYTO-IMP: ABNORMAL

## 2022-06-01 NOTE — PROGRESS NOTES
Please advise pt that PAP smear was abnormal, recommend GYN follow up.    Please refer to Dr. Tamiko Falcon

## 2022-06-08 ENCOUNTER — HOSPITAL ENCOUNTER (OUTPATIENT)
Dept: MAMMOGRAPHY | Age: 62
Discharge: HOME | End: 2022-06-08
Attending: FAMILY MEDICINE
Payer: MEDICAID

## 2022-06-08 DIAGNOSIS — Z12.31 VISIT FOR SCREENING MAMMOGRAM: ICD-10-CM

## 2022-06-08 PROCEDURE — 77067 SCR MAMMO BI INCL CAD: CPT

## 2022-08-24 ENCOUNTER — OFFICE VISIT (OUTPATIENT)
Dept: FAMILY MEDICINE CLINIC | Age: 62
End: 2022-08-24
Payer: MEDICAID

## 2022-08-24 ENCOUNTER — TELEPHONE (OUTPATIENT)
Dept: FAMILY MEDICINE CLINIC | Age: 62
End: 2022-08-24

## 2022-08-24 VITALS
WEIGHT: 165.2 LBS | RESPIRATION RATE: 16 BRPM | HEIGHT: 63 IN | HEART RATE: 56 BPM | BODY MASS INDEX: 29.27 KG/M2 | SYSTOLIC BLOOD PRESSURE: 146 MMHG | TEMPERATURE: 98 F | DIASTOLIC BLOOD PRESSURE: 78 MMHG | OXYGEN SATURATION: 98 %

## 2022-08-24 DIAGNOSIS — I10 ESSENTIAL HYPERTENSION: Primary | ICD-10-CM

## 2022-08-24 DIAGNOSIS — M51.34 THORACIC DEGENERATIVE DISC DISEASE: ICD-10-CM

## 2022-08-24 DIAGNOSIS — M79.605 LEFT LEG PAIN: ICD-10-CM

## 2022-08-24 DIAGNOSIS — R73.9 ELEVATED BLOOD SUGAR: ICD-10-CM

## 2022-08-24 DIAGNOSIS — E03.9 HYPOTHYROIDISM, UNSPECIFIED TYPE: ICD-10-CM

## 2022-08-24 PROCEDURE — 99214 OFFICE O/P EST MOD 30 MIN: CPT | Performed by: FAMILY MEDICINE

## 2022-08-24 RX ORDER — AMITRIPTYLINE HYDROCHLORIDE 50 MG/1
100 TABLET, FILM COATED ORAL
Qty: 180 TABLET | Refills: 3 | Status: SHIPPED | OUTPATIENT
Start: 2022-08-24 | End: 2022-10-20

## 2022-08-24 NOTE — PROGRESS NOTES
1. \"Have you been to the ER, urgent care clinic since your last visit? Hospitalized since your last visit? \" No    2. \"Have you seen or consulted any other health care providers outside of the [address] [place] [address] since your last visit? \" No     3. For patients aged 39-70: Has the patient had a colonoscopy / FIT/ Cologuard? No Pt states she has an upcoming appointment Sept 9th. If the patient is female:    4. For patients aged 41-77: Has the patient had a mammogram within the past 2 years? Yes - Care Gap present. Most recent result on file      5. For patients aged 21-65: Has the patient had a pap smear? Yes - Care Gap present. Most recent result on file    Health Maintenance Due   Topic Date Due    Colorectal Cancer Screening Combo  Never done    COVID-19 Vaccine (3 - Booster for Marilyn Reas series) 10/19/2021     Chief Complaint   Patient presents with    Hypertension     Follow up    Leg Injury     Pt states she would like the doctor to take a look at her (L) leg. She states she broke it in the past.      Pt would also like a referral for orthopedics for back spurs.

## 2022-08-24 NOTE — PROGRESS NOTES
Chief Complaint   Patient presents with    Hypertension     Follow up    Leg Injury     Pt states she would like the doctor to take a look at her (L) leg. She states she broke it in the past.      Pt reports that she has not been monitoring her BP lately, but she can. Pt is working with her Gyn on past abnormal PAP smear. Pt is scheduled for a colonoscopy September 9th. Pt has been taking her medications daily. Pt reports increased upper back pain, has been diagnosed with arthritis and bone spurs in the past    Subjective: (As above and below)     Chief Complaint   Patient presents with    Hypertension     Follow up    Leg Injury     Pt states she would like the doctor to take a look at her (L) leg. She states she broke it in the past.      she is a 60y.o. year old female who presents for evaluation. Reviewed PmHx, RxHx, FmHx, SocHx, AllgHx and updated in chart. Review of Systems - negative except as listed above    Objective:     Vitals:    08/24/22 0738 08/24/22 0752   BP: (!) 154/84 (!) 146/78   Pulse: (!) 56    Resp: 16    Temp: 98 °F (36.7 °C)    TempSrc: Temporal    SpO2: 98%    Weight: 165 lb 3.2 oz (74.9 kg)    Height: 5' 3\" (1.6 m)      Physical Examination: General appearance - alert, well appearing, and in no distress  Mental status - normal mood, behavior, speech, dress, motor activity, and thought processes  Ears - bilateral TM's and external ear canals normal  Chest - clear to auscultation, no wheezes, rales or rhonchi, symmetric air entry  Heart - normal rate, regular rhythm, normal S1, S2, no murmurs, rubs, clicks or gallops  Musculoskeletal - no joint tenderness, deformity or swelling  Extremities - left leg tenderness over old well healed scar, small ecchymosis    Assessment/ Plan:   1. Essential hypertension  -elevated in office, pt given home log to track, will adjust meds if levels remains elevated, recheck in 2 weeks  - CBC W/O DIFF;  Future  - VITAMIN D, 25 HYDROXY; Future  - CBC W/O DIFF  - VITAMIN D, 25 HYDROXY  - amitriptyline (ELAVIL) 50 mg tablet; Take 2 Tablets by mouth nightly. Dispense: 180 Tablet; Refill: 3    2. Hypothyroidism, unspecified type  - TSH 3RD GENERATION; Future  - TSH 3RD GENERATION    3. Elevated blood sugar  - METABOLIC PANEL, COMPREHENSIVE; Future  - LIPID PANEL; Future  - HEMOGLOBIN A1C WITH EAG; Future  - METABOLIC PANEL, COMPREHENSIVE  - LIPID PANEL  - HEMOGLOBIN A1C WITH EAG    4. Left leg pain  -resolving, will monitor     5. Thoracic degenerative disc disease  -check imaging, discussed probable therapy  - XR SPINE THORAC 3 V; Future    I have discussed the diagnosis with the patient and the intended plan as seen in the above orders. The patient has received an after-visit summary and questions were answered concerning future plans.      Medication Side Effects and Warnings were discussed with patient: yes  Patient Labs were reviewed: yes  Patient Past Records were reviewed:  yes    Leroy Bejarano M.D.

## 2022-08-25 LAB
25(OH)D3+25(OH)D2 SERPL-MCNC: 58.4 NG/ML (ref 30–100)
ALBUMIN SERPL-MCNC: 4.1 G/DL (ref 3.8–4.8)
ALBUMIN/GLOB SERPL: 1.8 {RATIO} (ref 1.2–2.2)
ALP SERPL-CCNC: 76 IU/L (ref 44–121)
ALT SERPL-CCNC: 11 IU/L (ref 0–32)
AST SERPL-CCNC: 13 IU/L (ref 0–40)
BILIRUB SERPL-MCNC: 0.2 MG/DL (ref 0–1.2)
BUN SERPL-MCNC: 21 MG/DL (ref 8–27)
BUN/CREAT SERPL: 30 (ref 12–28)
CALCIUM SERPL-MCNC: 9.4 MG/DL (ref 8.7–10.3)
CHLORIDE SERPL-SCNC: 102 MMOL/L (ref 96–106)
CHOLEST SERPL-MCNC: 137 MG/DL (ref 100–199)
CO2 SERPL-SCNC: 25 MMOL/L (ref 20–29)
CREAT SERPL-MCNC: 0.69 MG/DL (ref 0.57–1)
EGFR: 97 ML/MIN/1.73
ERYTHROCYTE [DISTWIDTH] IN BLOOD BY AUTOMATED COUNT: 16.7 % (ref 11.7–15.4)
EST. AVERAGE GLUCOSE BLD GHB EST-MCNC: 111 MG/DL
GLOBULIN SER CALC-MCNC: 2.3 G/DL (ref 1.5–4.5)
GLUCOSE SERPL-MCNC: 96 MG/DL (ref 65–99)
HBA1C MFR BLD: 5.5 % (ref 4.8–5.6)
HCT VFR BLD AUTO: 37.1 % (ref 34–46.6)
HDLC SERPL-MCNC: 67 MG/DL
HGB BLD-MCNC: 10.9 G/DL (ref 11.1–15.9)
IMP & REVIEW OF LAB RESULTS: NORMAL
LDLC SERPL CALC-MCNC: 60 MG/DL (ref 0–99)
MCH RBC QN AUTO: 22.9 PG (ref 26.6–33)
MCHC RBC AUTO-ENTMCNC: 29.4 G/DL (ref 31.5–35.7)
MCV RBC AUTO: 78 FL (ref 79–97)
PLATELET # BLD AUTO: 246 X10E3/UL (ref 150–450)
POTASSIUM SERPL-SCNC: 4.6 MMOL/L (ref 3.5–5.2)
PROT SERPL-MCNC: 6.4 G/DL (ref 6–8.5)
RBC # BLD AUTO: 4.76 X10E6/UL (ref 3.77–5.28)
SODIUM SERPL-SCNC: 140 MMOL/L (ref 134–144)
TRIGL SERPL-MCNC: 41 MG/DL (ref 0–149)
TSH SERPL DL<=0.005 MIU/L-ACNC: 3.55 UIU/ML (ref 0.45–4.5)
VLDLC SERPL CALC-MCNC: 10 MG/DL (ref 5–40)
WBC # BLD AUTO: 5.5 X10E3/UL (ref 3.4–10.8)

## 2022-08-25 NOTE — TELEPHONE ENCOUNTER
Please inform of thoracic spine x-ray results below:    Diffuse arthritis noted  No acute changes noted    Recommend physical therapy, please determine pt's preferred location.

## 2022-10-13 DIAGNOSIS — E03.9 HYPOTHYROIDISM, UNSPECIFIED TYPE: Chronic | ICD-10-CM

## 2022-10-13 DIAGNOSIS — J45.20 MILD INTERMITTENT ASTHMA, UNSPECIFIED WHETHER COMPLICATED: ICD-10-CM

## 2022-10-13 RX ORDER — MONTELUKAST SODIUM 10 MG/1
TABLET ORAL
Qty: 90 TABLET | Refills: 0 | Status: SHIPPED | OUTPATIENT
Start: 2022-10-13

## 2022-10-13 RX ORDER — LEVOTHYROXINE SODIUM 25 UG/1
TABLET ORAL
Qty: 90 TABLET | Refills: 0 | Status: SHIPPED | OUTPATIENT
Start: 2022-10-13

## 2022-11-07 ENCOUNTER — DOCUMENTATION ONLY (OUTPATIENT)
Dept: INTERNAL MEDICINE CLINIC | Age: 62
End: 2022-11-07

## 2022-11-07 NOTE — PROGRESS NOTES
Plan called @ [phone] PA completed with BOLIVAR DAVID,for Linzess 145 mcg capsules. Claim Reference # [id number]. PA was renewed for next refill date 12/5/22. Effective 11/7/22 thru 5/6/23. [facility] pharmacy called Li Gibson verified understanding.

## 2023-01-30 ENCOUNTER — OFFICE VISIT (OUTPATIENT)
Dept: FAMILY MEDICINE CLINIC | Age: 63
End: 2023-01-30
Payer: MEDICAID

## 2023-01-30 VITALS
SYSTOLIC BLOOD PRESSURE: 129 MMHG | DIASTOLIC BLOOD PRESSURE: 73 MMHG | HEART RATE: 66 BPM | HEIGHT: 63 IN | RESPIRATION RATE: 16 BRPM | OXYGEN SATURATION: 97 % | WEIGHT: 154 LBS | BODY MASS INDEX: 27.29 KG/M2

## 2023-01-30 DIAGNOSIS — J30.9 ALLERGIC RHINITIS, UNSPECIFIED SEASONALITY, UNSPECIFIED TRIGGER: ICD-10-CM

## 2023-01-30 DIAGNOSIS — Z98.84 S/P BARIATRIC SURGERY: ICD-10-CM

## 2023-01-30 DIAGNOSIS — M25.512 CHRONIC LEFT SHOULDER PAIN: ICD-10-CM

## 2023-01-30 DIAGNOSIS — I10 ESSENTIAL HYPERTENSION: ICD-10-CM

## 2023-01-30 DIAGNOSIS — G89.29 CHRONIC LEFT SHOULDER PAIN: ICD-10-CM

## 2023-01-30 DIAGNOSIS — Z23 NEEDS FLU SHOT: Primary | ICD-10-CM

## 2023-01-30 DIAGNOSIS — H91.93 DECREASED HEARING OF BOTH EARS: ICD-10-CM

## 2023-01-30 PROCEDURE — 3074F SYST BP LT 130 MM HG: CPT | Performed by: FAMILY MEDICINE

## 2023-01-30 PROCEDURE — 3078F DIAST BP <80 MM HG: CPT | Performed by: FAMILY MEDICINE

## 2023-01-30 PROCEDURE — 99214 OFFICE O/P EST MOD 30 MIN: CPT | Performed by: FAMILY MEDICINE

## 2023-01-30 PROCEDURE — 90686 IIV4 VACC NO PRSV 0.5 ML IM: CPT | Performed by: FAMILY MEDICINE

## 2023-01-30 RX ORDER — FLUTICASONE PROPIONATE 50 MCG
2 SPRAY, SUSPENSION (ML) NASAL DAILY
Qty: 1 EACH | Refills: 3 | Status: SHIPPED | OUTPATIENT
Start: 2023-01-30

## 2023-01-30 RX ORDER — AMLODIPINE BESYLATE 2.5 MG/1
2.5 TABLET ORAL DAILY
Qty: 90 TABLET | Refills: 1 | Status: SHIPPED | OUTPATIENT
Start: 2023-01-30

## 2023-01-30 NOTE — PATIENT INSTRUCTIONS
Vaccine Information Statement    Influenza (Flu) Vaccine (Inactivated or Recombinant): What You Need to Know    Many vaccine information statements are available in Portuguese and other languages. See www.immunize.org/vis. Hojas de información sobre vacunas están disponibles en español y en muchos otros idiomas. Visite www.immunize.org/vis. 1. Why get vaccinated? Influenza vaccine can prevent influenza (flu). Flu is a contagious disease that spreads around the [facility] every year, usually between October and May. Anyone can get the flu, but it is more dangerous for some people. Infants and young children, people 72 years and older, pregnant people, and people with certain health conditions or a weakened immune system are at greatest risk of flu complications. Pneumonia, bronchitis, sinus infections, and ear infections are examples of flu-related complications. If you have a medical condition, such as heart disease, cancer, or diabetes, flu can make it worse. Flu can cause fever and chills, sore throat, muscle aches, fatigue, cough, headache, and runny or stuffy nose. Some people may have vomiting and diarrhea, though this is more common in children than adults. In an average year, thousands of people in the [facility] die from flu, and many more are hospitalized. Flu vaccine prevents millions of illnesses and flu-related visits to the doctor each year. 2. Influenza vaccines     CDC recommends everyone 6 months and older get vaccinated every flu season. Children 6 months through 6years of age may need 2 doses during a single flu season. Everyone else needs only 1 dose each flu season. It takes about 2 weeks for protection to develop after vaccination. There are many flu viruses, and they are always changing. Each year a new flu vaccine is made to protect against the influenza viruses believed to be likely to cause disease in the upcoming flu season.  Even when the vaccine doesnt exactly match these viruses, it may still provide some protection. Influenza vaccine does not cause flu. Influenza vaccine may be given at the same time as other vaccines. 3. Talk with your health care provider    Tell your vaccination provider if the person getting the vaccine:  Has had an allergic reaction after a previous dose of influenza vaccine, or has any severe, life-threatening allergies   Has ever had Guillain-Barré Syndrome (also called GBS)    In some cases, your health care provider may decide to postpone influenza vaccination until a future visit. Influenza vaccine can be administered at any time during pregnancy. People who are or will be pregnant during influenza season should receive inactivated influenza vaccine. People with minor illnesses, such as a cold, may be vaccinated. People who are moderately or severely ill should usually wait until they recover before getting influenza vaccine. Your health care provider can give you more information. 4. Risks of a vaccine reaction    Soreness, redness, and swelling where the shot is given, fever, muscle aches, and headache can happen after influenza vaccination. There may be a very small increased risk of Guillain-Barré Syndrome (GBS) after inactivated influenza vaccine (the flu shot). Hernan Paredes children who get the flu shot along with pneumococcal vaccine (PCV13) and/or DTaP vaccine at the same time might be slightly more likely to have a seizure caused by fever. Tell your health care provider if a child who is getting flu vaccine has ever had a seizure. People sometimes faint after medical procedures, including vaccination. Tell your provider if you feel dizzy or have vision changes or ringing in the ears. As with any medicine, there is a very remote chance of a vaccine causing a severe allergic reaction, other serious injury, or death. 5. What if there is a serious problem?     An allergic reaction could occur after the vaccinated person leaves the clinic. If you see signs of a severe allergic reaction (hives, swelling of the face and throat, difficulty breathing, a fast heartbeat, dizziness, or weakness), call 9-1-1 and get the person to the nearest hospital.    For other signs that concern you, call your health care provider. Adverse reactions should be reported to the Vaccine Adverse Event Reporting System (VAERS). Your health care provider will usually file this report, or you can do it yourself. Visit the VAERS website at www.vaers. [facility].gov or call 8-491.300.1123. VAERS is only for reporting reactions, and VAERS staff members do not give medical advice. 6. The National Vaccine Injury Compensation Program    The [facility] Vaccine Injury Compensation Program (VICP) is a federal program that was created to compensate people who may have been injured by certain vaccines. Claims regarding alleged injury or death due to vaccination have a time limit for filing, which may be as short as two years. Visit the VICP website at www.[facility].gov/vaccinecompensation or call 8-894.576.6251 to learn about the program and about filing a claim. 7. How can I learn more? Ask your health care provider. Call your local or state health department. Visit the website of the Food and Drug Administration (FDA) for vaccine package inserts and additional information at www.fda.gov/vaccines-blood-biologics/vaccines. Contact the Centers for Disease Control and Prevention (CDC): Call 0-969.865.5137 (7-405-QYR-INFO) or  Visit CDCs influenza website at www.cdc.gov/flu. Vaccine Information Statement   Inactivated Influenza Vaccine   8/6/2021  [address] 721AA-45   Department of Health and Human Services  Centers for Disease Control and Prevention    Office Use Only

## 2023-01-30 NOTE — PROGRESS NOTES
Chief Complaint   Patient presents with    Hypertension     Pt would like a referral for ENT and pt states she is having hip/back and shoulder pain. Pt would like to discuss allergy medication    Health Maintenance Due   Topic Date Due    Colorectal Cancer Screening Combo  Never done    COVID-19 Vaccine (3 - Booster for Moderna series) 07/14/2021    Flu Vaccine (1) 08/01/2022     1. \"Have you been to the ER, urgent care clinic since your last visit? Hospitalized since your last visit? \"  Yes, Tamia Dejesus for a fall and concussion a few months ago     2. \"Have you seen or consulted any other health care providers outside of the [address] [place] [address] since your last visit? \"  [facility]       3. For patients aged 39-70: Has the patient had a colonoscopy / FIT/ Cologuard? No      If the patient is female:    4. For patients aged 41-77: Has the patient had a mammogram within the past 2 years? Yes - no Care Gap present      5. For patients aged 21-65: Has the patient had a pap smear?  Yes - no Care Gap present

## 2023-01-30 NOTE — PROGRESS NOTES
Chief Complaint   Patient presents with    Hypertension     Pt would like a referral for ENT - has some nasal congestion, has noticed significant change in her hearing. Pt states she is having hip/back and shoulder pain. Pt had a recent fall, reports that it has irritated an old rotator cuff injury that was previously surgically repaired. Pt would like to discuss allergy medication, taking Singulair daily, but still feel like her allergies are not well controlled. Pt reports that she has been working on weight loss, staying active. Subjective: (As above and below)     Chief Complaint   Patient presents with    Hypertension     she is a 58y.o. year old female who presents for evaluation. Reviewed PmHx, RxHx, FmHx, SocHx, AllgHx and updated in chart. Review of Systems - negative except as listed above    Objective:     Vitals:    01/30/23 0755   BP: 129/73   Pulse: 66   Resp: 16   SpO2: 97%   Weight: 154 lb (69.9 kg)   Height: 5' 3\" (1.6 m)     Physical Examination: General appearance - alert, well appearing, and in no distress  Mental status - normal mood, behavior, speech, dress, motor activity, and thought processes  Ears - bilateral TM's and external ear canals normal  Chest - clear to auscultation, no wheezes, rales or rhonchi, symmetric air entry  Heart - normal rate, regular rhythm, normal S1, S2, no murmurs, rubs, clicks or gallops  Extremities - peripheral pulses normal, no pedal edema, no clubbing or cyanosis    Assessment/ Plan:   1. Needs flu shot  - INFLUENZA, FLUARIX, FLULAVAL, FLUZONE (AGE 6 MO+), AFLURIA(AGE 3Y+) IM, PF, 0.5 ML    2. Essential hypertension  -home readings are all elevated 706-953 systolic, will add low dose amlodipine   - amLODIPine (NORVASC) 2.5 mg tablet; Take 1 Tablet by mouth daily. Dispense: 90 Tablet; Refill: 1    3. S/P bariatric surgery  -doing well    4. Decreased hearing of both ears  - REFERRAL TO ENT-OTOLARYNGOLOGY    5.  Allergic rhinitis, unspecified seasonality, unspecified trigger  - REFERRAL TO ENT-OTOLARYNGOLOGY  -add nasal spray  - fluticasone propionate (FLONASE) 50 mcg/actuation nasal spray; 2 Sprays by Both Nostrils route daily. Dispense: 1 Each; Refill: 3    6. Chronic left shoulder pain  - REFERRAL TO ORTHOPEDICS       I have discussed the diagnosis with the patient and the intended plan as seen in the above orders. The patient has received an after-visit summary and questions were answered concerning future plans.      Medication Side Effects and Warnings were discussed with patient: yes  Patient Labs were reviewed: yes  Patient Past Records were reviewed:  yes    Janet Ruiz M.D.

## 2023-02-07 DIAGNOSIS — M51.34 THORACIC DEGENERATIVE DISC DISEASE: ICD-10-CM

## 2023-02-09 DIAGNOSIS — E03.9 HYPOTHYROIDISM, UNSPECIFIED TYPE: Chronic | ICD-10-CM

## 2023-02-09 DIAGNOSIS — J45.20 MILD INTERMITTENT ASTHMA, UNSPECIFIED WHETHER COMPLICATED: ICD-10-CM

## 2023-02-10 RX ORDER — MONTELUKAST SODIUM 10 MG/1
TABLET ORAL
Qty: 90 TABLET | Refills: 0 | Status: SHIPPED | OUTPATIENT
Start: 2023-02-10

## 2023-02-10 RX ORDER — LEVOTHYROXINE SODIUM 25 UG/1
TABLET ORAL
Qty: 90 TABLET | Refills: 0 | Status: SHIPPED | OUTPATIENT
Start: 2023-02-10

## 2023-02-10 RX ORDER — LORATADINE 10 MG/1
TABLET ORAL
Qty: 90 TABLET | Refills: 0 | Status: SHIPPED | OUTPATIENT
Start: 2023-02-10

## 2023-05-08 RX ORDER — MONTELUKAST SODIUM 10 MG/1
TABLET ORAL
Qty: 90 TABLET | Refills: 3 | Status: SHIPPED | OUTPATIENT
Start: 2023-05-08

## 2023-05-14 RX ORDER — FLUTICASONE PROPIONATE 50 MCG
SPRAY, SUSPENSION (ML) NASAL
Qty: 16 G | Refills: 0 | Status: SHIPPED | OUTPATIENT
Start: 2023-05-14

## 2023-06-30 RX ORDER — AMLODIPINE BESYLATE 2.5 MG/1
TABLET ORAL
Qty: 90 TABLET | Refills: 0 | Status: SHIPPED | OUTPATIENT
Start: 2023-06-30

## 2023-08-11 ENCOUNTER — ANESTHESIA EVENT (OUTPATIENT)
Facility: HOSPITAL | Age: 63
End: 2023-08-11
Payer: MEDICAID

## 2023-08-14 ENCOUNTER — HOSPITAL ENCOUNTER (OUTPATIENT)
Facility: HOSPITAL | Age: 63
Discharge: HOME | End: 2023-08-14
Attending: ORTHOPAEDIC SURGERY | Admitting: ORTHOPAEDIC SURGERY
Payer: MEDICAID

## 2023-08-14 ENCOUNTER — ANESTHESIA (OUTPATIENT)
Facility: HOSPITAL | Age: 63
End: 2023-08-14
Payer: MEDICAID

## 2023-08-14 VITALS
SYSTOLIC BLOOD PRESSURE: 140 MMHG | OXYGEN SATURATION: 100 % | WEIGHT: 146 LBS | BODY MASS INDEX: 25.87 KG/M2 | TEMPERATURE: 97.6 F | HEART RATE: 54 BPM | HEIGHT: 63 IN | RESPIRATION RATE: 14 BRPM | DIASTOLIC BLOOD PRESSURE: 60 MMHG

## 2023-08-14 DIAGNOSIS — G89.18 POST-OP PAIN: Primary | ICD-10-CM

## 2023-08-14 PROCEDURE — 7100000001 HC PACU RECOVERY - ADDTL 15 MIN: Performed by: ORTHOPAEDIC SURGERY

## 2023-08-14 PROCEDURE — 7100000011 HC PHASE II RECOVERY - ADDTL 15 MIN: Performed by: ORTHOPAEDIC SURGERY

## 2023-08-14 PROCEDURE — 2580000003 HC RX 258: Performed by: ORTHOPAEDIC SURGERY

## 2023-08-14 PROCEDURE — 2500000003 HC RX 250 WO HCPCS: Performed by: NURSE ANESTHETIST, CERTIFIED REGISTERED

## 2023-08-14 PROCEDURE — 3700000000 HC ANESTHESIA ATTENDED CARE: Performed by: ORTHOPAEDIC SURGERY

## 2023-08-14 PROCEDURE — 3600000002 HC SURGERY LEVEL 2 BASE: Performed by: ORTHOPAEDIC SURGERY

## 2023-08-14 PROCEDURE — 2709999900 HC NON-CHARGEABLE SUPPLY: Performed by: ORTHOPAEDIC SURGERY

## 2023-08-14 PROCEDURE — 6360000002 HC RX W HCPCS: Performed by: NURSE ANESTHETIST, CERTIFIED REGISTERED

## 2023-08-14 PROCEDURE — 3600000012 HC SURGERY LEVEL 2 ADDTL 15MIN: Performed by: ORTHOPAEDIC SURGERY

## 2023-08-14 PROCEDURE — 7100000010 HC PHASE II RECOVERY - FIRST 15 MIN: Performed by: ORTHOPAEDIC SURGERY

## 2023-08-14 PROCEDURE — 3700000001 HC ADD 15 MINUTES (ANESTHESIA): Performed by: ORTHOPAEDIC SURGERY

## 2023-08-14 PROCEDURE — 2580000003 HC RX 258: Performed by: ANESTHESIOLOGY

## 2023-08-14 PROCEDURE — 7100000000 HC PACU RECOVERY - FIRST 15 MIN: Performed by: ORTHOPAEDIC SURGERY

## 2023-08-14 PROCEDURE — 2500000003 HC RX 250 WO HCPCS: Performed by: ORTHOPAEDIC SURGERY

## 2023-08-14 PROCEDURE — 6360000002 HC RX W HCPCS: Performed by: ORTHOPAEDIC SURGERY

## 2023-08-14 RX ORDER — MIDAZOLAM HYDROCHLORIDE 1 MG/ML
2 INJECTION, SOLUTION INTRAMUSCULAR; INTRAVENOUS
Status: DISCONTINUED | OUTPATIENT
Start: 2023-08-14 | End: 2023-08-14 | Stop reason: HOSPADM

## 2023-08-14 RX ORDER — SODIUM CHLORIDE 9 MG/ML
INJECTION, SOLUTION INTRAVENOUS PRN
Status: DISCONTINUED | OUTPATIENT
Start: 2023-08-14 | End: 2023-08-14 | Stop reason: HOSPADM

## 2023-08-14 RX ORDER — ONDANSETRON 2 MG/ML
INJECTION INTRAMUSCULAR; INTRAVENOUS PRN
Status: DISCONTINUED | OUTPATIENT
Start: 2023-08-14 | End: 2023-08-14 | Stop reason: SDUPTHER

## 2023-08-14 RX ORDER — LIDOCAINE HYDROCHLORIDE 20 MG/ML
INJECTION, SOLUTION EPIDURAL; INFILTRATION; INTRACAUDAL; PERINEURAL PRN
Status: DISCONTINUED | OUTPATIENT
Start: 2023-08-14 | End: 2023-08-14 | Stop reason: SDUPTHER

## 2023-08-14 RX ORDER — MIDAZOLAM HYDROCHLORIDE 1 MG/ML
INJECTION INTRAMUSCULAR; INTRAVENOUS PRN
Status: DISCONTINUED | OUTPATIENT
Start: 2023-08-14 | End: 2023-08-14 | Stop reason: SDUPTHER

## 2023-08-14 RX ORDER — FENTANYL CITRATE 50 UG/ML
25 INJECTION, SOLUTION INTRAMUSCULAR; INTRAVENOUS EVERY 5 MIN PRN
Status: DISCONTINUED | OUTPATIENT
Start: 2023-08-14 | End: 2023-08-14 | Stop reason: HOSPADM

## 2023-08-14 RX ORDER — SODIUM CHLORIDE, SODIUM LACTATE, POTASSIUM CHLORIDE, CALCIUM CHLORIDE 600; 310; 30; 20 MG/100ML; MG/100ML; MG/100ML; MG/100ML
INJECTION, SOLUTION INTRAVENOUS CONTINUOUS
Status: DISCONTINUED | OUTPATIENT
Start: 2023-08-14 | End: 2023-08-14 | Stop reason: HOSPADM

## 2023-08-14 RX ORDER — LIDOCAINE HYDROCHLORIDE 10 MG/ML
1 INJECTION, SOLUTION EPIDURAL; INFILTRATION; INTRACAUDAL; PERINEURAL
Status: DISCONTINUED | OUTPATIENT
Start: 2023-08-14 | End: 2023-08-14 | Stop reason: HOSPADM

## 2023-08-14 RX ORDER — SODIUM CHLORIDE 0.9 % (FLUSH) 0.9 %
5-40 SYRINGE (ML) INJECTION PRN
Status: DISCONTINUED | OUTPATIENT
Start: 2023-08-14 | End: 2023-08-14 | Stop reason: HOSPADM

## 2023-08-14 RX ORDER — FENTANYL CITRATE 50 UG/ML
INJECTION, SOLUTION INTRAMUSCULAR; INTRAVENOUS PRN
Status: DISCONTINUED | OUTPATIENT
Start: 2023-08-14 | End: 2023-08-14 | Stop reason: SDUPTHER

## 2023-08-14 RX ORDER — SODIUM CHLORIDE 0.9 % (FLUSH) 0.9 %
5-40 SYRINGE (ML) INJECTION EVERY 12 HOURS SCHEDULED
Status: DISCONTINUED | OUTPATIENT
Start: 2023-08-14 | End: 2023-08-14 | Stop reason: HOSPADM

## 2023-08-14 RX ORDER — ACETAMINOPHEN 500 MG
1000 TABLET ORAL
Status: DISCONTINUED | OUTPATIENT
Start: 2023-08-14 | End: 2023-08-14 | Stop reason: HOSPADM

## 2023-08-14 RX ORDER — MEPERIDINE HYDROCHLORIDE 25 MG/ML
12.5 INJECTION INTRAMUSCULAR; INTRAVENOUS; SUBCUTANEOUS EVERY 5 MIN PRN
Status: DISCONTINUED | OUTPATIENT
Start: 2023-08-14 | End: 2023-08-14 | Stop reason: HOSPADM

## 2023-08-14 RX ORDER — OXYCODONE HYDROCHLORIDE 5 MG/1
5 TABLET ORAL PRN
Status: DISCONTINUED | OUTPATIENT
Start: 2023-08-14 | End: 2023-08-14 | Stop reason: HOSPADM

## 2023-08-14 RX ORDER — OXYCODONE HYDROCHLORIDE 5 MG/1
10 TABLET ORAL PRN
Status: DISCONTINUED | OUTPATIENT
Start: 2023-08-14 | End: 2023-08-14 | Stop reason: HOSPADM

## 2023-08-14 RX ORDER — HYDROCODONE BITARTRATE AND ACETAMINOPHEN 5; 325 MG/1; MG/1
1 TABLET ORAL EVERY 4 HOURS PRN
Qty: 20 TABLET | Refills: 0 | Status: SHIPPED | OUTPATIENT
Start: 2023-08-14 | End: 2023-08-19

## 2023-08-14 RX ORDER — ONDANSETRON 2 MG/ML
4 INJECTION INTRAMUSCULAR; INTRAVENOUS
Status: DISCONTINUED | OUTPATIENT
Start: 2023-08-14 | End: 2023-08-14 | Stop reason: HOSPADM

## 2023-08-14 RX ORDER — DROPERIDOL 2.5 MG/ML
0.62 INJECTION, SOLUTION INTRAMUSCULAR; INTRAVENOUS
Status: DISCONTINUED | OUTPATIENT
Start: 2023-08-14 | End: 2023-08-14 | Stop reason: HOSPADM

## 2023-08-14 RX ADMIN — SODIUM CHLORIDE, POTASSIUM CHLORIDE, SODIUM LACTATE AND CALCIUM CHLORIDE: 600; 310; 30; 20 INJECTION, SOLUTION INTRAVENOUS at 08:31

## 2023-08-14 RX ADMIN — WATER 2000 MG: 1 INJECTION INTRAMUSCULAR; INTRAVENOUS; SUBCUTANEOUS at 08:35

## 2023-08-14 RX ADMIN — MIDAZOLAM HYDROCHLORIDE 2 MG: 1 INJECTION, SOLUTION INTRAMUSCULAR; INTRAVENOUS at 08:32

## 2023-08-14 RX ADMIN — PROPOFOL 80 MCG/KG/MIN: 10 INJECTION, EMULSION INTRAVENOUS at 08:38

## 2023-08-14 RX ADMIN — FENTANYL CITRATE 25 MCG: 50 INJECTION, SOLUTION INTRAMUSCULAR; INTRAVENOUS at 08:43

## 2023-08-14 RX ADMIN — LIDOCAINE HYDROCHLORIDE 60 MG: 20 INJECTION, SOLUTION EPIDURAL; INFILTRATION; INTRACAUDAL; PERINEURAL at 08:38

## 2023-08-14 RX ADMIN — ONDANSETRON HYDROCHLORIDE 4 MG: 2 INJECTION, SOLUTION INTRAMUSCULAR; INTRAVENOUS at 08:55

## 2023-08-14 RX ADMIN — PROPOFOL 25 MG: 10 INJECTION, EMULSION INTRAVENOUS at 08:45

## 2023-08-14 RX ADMIN — FENTANYL CITRATE 25 MCG: 50 INJECTION, SOLUTION INTRAMUSCULAR; INTRAVENOUS at 08:38

## 2023-08-14 ASSESSMENT — PAIN - FUNCTIONAL ASSESSMENT: PAIN_FUNCTIONAL_ASSESSMENT: NONE - DENIES PAIN

## 2023-08-14 NOTE — PERIOP NOTE
Cande Lopez  1960  [id number]    Situation:  Verbal report given from: ARPIT Currie CRNA and Rene Pascual  Procedure: Procedure(s):  RIGHT INDEX AND SMALL FINGER TRIGGER FINGER  RELEASE ([facility])    Background:    Preoperative diagnosis: Trigger finger, right index finger [M65.321]  Trigger finger, right little finger [M65.351]    Postoperative diagnosis: * No post-op diagnosis entered *    :  Dr. Marietta Jauregui): Circulator: Crystal Cohen RN; Sunitha Powers RN  Scrub Person First: [place]  Physician Assistant: Thomas Shafer PA-C    Specimens: * No specimens in log *    Assessment:  Intra-procedure medications         Anesthesia gave intra-procedure sedation and medications, see anesthesia flow sheet     Intravenous fluids: Jake Sprout     Vital signs stable       Recommendation:    Permission to share finding with  : Lindsey Unger
Permission received to review discharge instructions and discuss private health information with sister Christina Lee. Patient states family/friend will be with them for 24 hours following procedure.
Pt discharged via wheelchair, accompanied by RN. Pt discharged awake and alert, respirations equal and unlabored, skin warm, dry, and intact. Pt and family members' questions and concerns addressed prior to discharge.
to continue taking. Please do not stop taking these medications without instructions from your surgeon. 6. In an effort to help prevent surgical site infection, we ask that you shower with an anti-bacterial soap (i.e. Dial/Safeguard, or the soap provided to you at your preadmission testing appointment) for 3 days prior to and on the morning of surgery, using a fresh towel after each shower. (Please begin this process with fresh bed linens.) Do not apply any lotions, powders, or deodorants after the shower on the day of your procedure. If applicable, please do not shave the operative site for 48 hours prior to surgery. 7. Wear comfortable clothes. Wear glasses instead of contacts. Do not bring any jewelry or money (other than copays or fees as instructed). Do not wear make-up, particularly mascara, the morning of your surgery. Do not wear nail polish, particularly if you are having foot /hand surgery. Wear your hair loose or down, no ponytails, buns, marivel pins or clips. All body piercings must be removed. 8. You should understand that if you do not follow these instructions your surgery may be cancelled. If your physical condition changes (i.e. fever, cold or flu) please contact your surgeon as soon as possible. 9. It is important that you be on time. If a situation occurs where you may be late, or if you have any questions or problems, please call [phone].    10. Your surgery time may be subject to change. You will receive a phone call the day prior to surgery to confirm your arrival time. 11. Pediatric patients: please bring a change of clothes, diapers, bottle/sippy cup, pacifier, etc.      Special Instructions:Bring inhalers    Take all medications and inhalers, as prescribed, on the morning of surgery with a sip of water . Insulin Dependent Diabetic patients: Take your diabetic medications as prescribed the day before surgery. Hold all diabetic medications the day of surgery.     If

## 2023-08-14 NOTE — OP NOTE
PATIENT NAME:  Domitila Lopez    SURGEON:  Stcaey Kearney MD    DATE OF SURGERY:  8/14/2023    LOCATION: [facility] ASU    PREOPERATIVE DIAGNOSIS:  right index and small finger trigger digit    POSTOPERATIVE DIAGNOSIS:  Same    PROCEDURE:  right index and small finger trigger release             ANESTHESIA:  Local 0.25% bupivicaine with 1% lidocaine with sedation    BLOOD LOSS:  Minimal    Tourniquet time: 8 min    Assistant: Shree Russell PA-C     OPERATIVE INDICATIONS:INDICATIONS FOR OPERATION:      The patient is a 58 y.o. old female who has developed a persistent right index and small finger trigger digit, unresponsive to all conservative treatment. The patient has elected to undergo an A1 pulley release. She understands the alternatives to surgery, the nature of this elective procedure, the usual recovery, possible variations in healing, and the potential for shortcomings and complications ( including but not exclusive to bleeding, infection, scar tenderness,  weakness, ). DESCRIPTION  OF PROCEDURE: After satisfactory sedation had been attained, the right hand and forearm were prepped and draped in a sterile field. A timeout was taken and the operative site was confirmed. Local anesthesia was instilled into the incision site. The extremity was then elevated and exsanguinated and a forearm tourniquet was inflated to 200 mmHg. A small vertical incision was made in the distal palm over the A1 pulley of the index finger. Dissection was carried through the subcutaneous tissue and digital nerves were protected. The A1 annular pulley was reelased after which there was no evidence of triggering with flexion and extension of the digit. The flexor tendons were elevated out of the wound and noted to be without adhesions. In the same fashion, the small finger was addressed. A small vertical incision was made in the distal palm over the A1 pulley.   Dissection was carried through the subcutaneous tissue and

## 2023-08-14 NOTE — ANESTHESIA POSTPROCEDURE EVALUATION
Department of Anesthesiology  Postprocedure Note    Patient: Anahy Weems  MRN: [id number]  YOB: 1960  Date of evaluation: 8/14/2023      Procedure Summary     Date: 08/14/23 Room / Location: [facility] ASU  / [facility] AMBULATORY OR    Anesthesia Start: 0832 Anesthesia Stop: 9709    Procedure: RIGHT INDEX AND SMALL FINGER TRIGGER FINGER  RELEASE (LOCAL W/MAC) (Right: Hand) Diagnosis:       Trigger finger, right index finger      Trigger finger, right little finger      (Trigger finger, right index finger [M65.321])      (Trigger finger, right little finger [M65.351])    Surgeons: Karolina Hoffman MD Responsible Provider: Ginette Castillo MD    Anesthesia Type: MAC ASA Status: 2          Anesthesia Type: MAC    Mason Phase I: Mason Score: 10    Mason Phase II: Mason Score: 10      Anesthesia Post Evaluation    Patient location during evaluation: PACU  Patient participation: complete - patient participated  Level of consciousness: awake and alert  Pain score: 0  Airway patency: patent  Nausea & Vomiting: no nausea and no vomiting  Complications: no  Cardiovascular status: hemodynamically stable  Respiratory status: acceptable  Hydration status: euvolemic  Multimodal analgesia pain management approach  Pain management: satisfactory to patient

## 2023-08-14 NOTE — ANESTHESIA PRE PROCEDURE
Department of Anesthesiology  Preprocedure Note       Name:  Chelly Hampton   Age:  58 y.o.  :  1960                                          MRN:  [id number]         Date:  2023      Surgeon: Cami Mckee):  Isaac Castillo MD    Procedure: Procedure(s):  RIGHT INDEX AND SMALL FINGER TRIGGER FINGER  RELEASE ([facility])    Medications prior to admission:   Prior to Admission medications    Medication Sig Start Date End Date Taking?  Authorizing Provider   amLODIPine (NORVASC) 2.5 MG tablet Take 1 tablet by mouth once daily 23   Matthew Cuoch MD   fluticasone (FLONASE) 50 MCG/ACT nasal spray Use 2 spray(s) in each nostril once daily 23   Matthew Couch MD   montelukast (SINGULAIR) 10 MG tablet Take 1 tablet by mouth once daily 23   Matthew Couch MD   acetaminophen (TYLENOL) 500 MG tablet Take 1 tablet by mouth as needed 17   Ar Automatic Reconciliation   albuterol sulfate HFA (PROVENTIL;VENTOLIN;PROAIR) 108 (90 Base) MCG/ACT inhaler INHALE 2 PUFFS BY MOUTH EVERY 4 HOURS AS NEEDED FOR WHEEZING FOR SHORTNESS OF BREATH 22   Ar Automatic Reconciliation   amitriptyline (ELAVIL) 50 MG tablet Take 2 tablets by mouth nightly 10/20/22   Ar Automatic Reconciliation   levothyroxine (SYNTHROID) 25 MCG tablet TAKE 1 TABLET BY MOUTH ONCE DAILY BEFORE BREAKFAST 2/10/23   Ar Automatic Reconciliation   linaclotide (LINZESS) 145 MCG capsule TAKE 1 CAPSULE BY MOUTH ONCE DAILY BEFORE BREAKFAST 10/20/22   Ar Automatic Reconciliation   lisinopril (PRINIVIL;ZESTRIL) 40 MG tablet Take 1 tablet by mouth daily 21   Ar Automatic Reconciliation   loratadine (CLARITIN) 10 MG tablet Take 1 tablet by mouth daily 2/10/23   Ar Automatic Reconciliation   magnesium oxide (MAG-OX) 400 MG tablet Take 1 tablet by mouth 2 times daily 21   Ar Automatic Reconciliation       Current medications:    Current Facility-Administered Medications   Medication Dose Route Frequency Provider Last Rate Last

## 2023-08-14 NOTE — H&P
Subjective:   Patient ID: Gia Holguin is a 58 y.o. female. Chief Complaint: Pain and Follow-up of the Right Little Finger and Follow-up and Pain of the Right Index Finger    Comes today for follow-up of her right hand. I have previously seen her for right-sided trigger fingers. She is reporting right index and small finger pain and locking. I previously seen her for these issues. Have performed injections in the past. Symptoms have now recurred and worsened. The right index finger has been injected twice. Last injection was earlier this year in January. ROS, PMHx, and SocHx reviewed with no changes. Objective:   Constitutional: No acute distress. Well nourished. Well developed. Eyes: Sclera are nonicteric. Respiratory: No labored breathing. See anesthesia note for full exam.   Cardiovascular: No marked edema. See anesthesia note for full exam.   Skin: No marked skin ulcers. Neurological: see below  Psychiatric: Alert and oriented x3. Musculoskeletal   Examination of the right hand demonstrates there is tenderness to palpation at the A1 pulley of the index and small finger. There is active triggering. Neurovascularly intact distally. Radiographs:       No imaging obtained     Assessment:     1. Trigger finger, right index finger   2. Trigger finger, right little finger     Plan:     Discussed nature condition as well as treatment options. She is failed meaningful nonsurgical management for her right-sided trigger fingers. Does desire surgical management. Risks benefits and alternatives including the significant risk of postoperative stiffness are discussed. Postoperative course discussed. Consents to proceed. Surgical date chosen    Orders Placed This Encounter   Surgical Posting Sheet   BMI Patient Education     Update History & Physical    The patient's History and Physical of was reviewed with the patient and I examined the patient. There was no change.  The surgical site was confirmed by the

## 2023-08-17 ENCOUNTER — TELEPHONE (OUTPATIENT)
Age: 63
End: 2023-08-17

## 2023-08-17 NOTE — TELEPHONE ENCOUNTER
Pt is calling because she had surgery on 08/14 and has not had a BM    Pt was informed that if she has not had a BM in the next few days to call her PCP to get something to make her go    [facility] in [address]

## 2023-08-21 ENCOUNTER — OFFICE VISIT (OUTPATIENT)
Age: 63
End: 2023-08-21
Payer: MEDICAID

## 2023-08-21 VITALS
TEMPERATURE: 97.6 F | SYSTOLIC BLOOD PRESSURE: 116 MMHG | WEIGHT: 147.4 LBS | BODY MASS INDEX: 26.11 KG/M2 | OXYGEN SATURATION: 98 % | DIASTOLIC BLOOD PRESSURE: 81 MMHG | HEART RATE: 66 BPM

## 2023-08-21 DIAGNOSIS — I10 ESSENTIAL HYPERTENSION: Primary | ICD-10-CM

## 2023-08-21 DIAGNOSIS — M25.562 CHRONIC PAIN OF LEFT KNEE: ICD-10-CM

## 2023-08-21 DIAGNOSIS — G89.29 CHRONIC PAIN OF LEFT KNEE: ICD-10-CM

## 2023-08-21 DIAGNOSIS — J30.9 ALLERGIC RHINITIS, UNSPECIFIED SEASONALITY, UNSPECIFIED TRIGGER: ICD-10-CM

## 2023-08-21 PROCEDURE — 3074F SYST BP LT 130 MM HG: CPT | Performed by: FAMILY MEDICINE

## 2023-08-21 PROCEDURE — 3079F DIAST BP 80-89 MM HG: CPT | Performed by: FAMILY MEDICINE

## 2023-08-21 PROCEDURE — 99214 OFFICE O/P EST MOD 30 MIN: CPT | Performed by: FAMILY MEDICINE

## 2023-08-21 RX ORDER — HYDROCODONE BITARTRATE AND ACETAMINOPHEN 5; 325 MG/1; MG/1
1 TABLET ORAL EVERY 6 HOURS PRN
COMMUNITY

## 2023-08-21 SDOH — ECONOMIC STABILITY: FOOD INSECURITY: WITHIN THE PAST 12 MONTHS, YOU WORRIED THAT YOUR FOOD WOULD RUN OUT BEFORE YOU GOT MONEY TO BUY MORE.: NEVER TRUE

## 2023-08-21 SDOH — ECONOMIC STABILITY: FOOD INSECURITY: WITHIN THE PAST 12 MONTHS, THE FOOD YOU BOUGHT JUST DIDN'T LAST AND YOU DIDN'T HAVE MONEY TO GET MORE.: NEVER TRUE

## 2023-08-21 SDOH — ECONOMIC STABILITY: HOUSING INSECURITY
IN THE LAST 12 MONTHS, WAS THERE A TIME WHEN YOU DID NOT HAVE A STEADY PLACE TO SLEEP OR SLEPT IN A SHELTER (INCLUDING NOW)?: NO

## 2023-08-21 SDOH — ECONOMIC STABILITY: INCOME INSECURITY: HOW HARD IS IT FOR YOU TO PAY FOR THE VERY BASICS LIKE FOOD, HOUSING, MEDICAL CARE, AND HEATING?: NOT HARD AT ALL

## 2023-08-21 NOTE — PROGRESS NOTES
Chief Complaint   Patient presents with    Discuss Medications     Follow up and wants a referral  for left leg pain. Health Maintenance Due   Topic Date Due    HIV screen  Never done    DTaP/Tdap/Td vaccine (1 - Tdap) Never done    Colorectal Cancer Screen  Never done    Shingles vaccine (1 of 2) Never done    COVID-19 Vaccine (3 - Booster for Moderna series) 07/14/2021    Flu vaccine (1) 08/01/2023           1. \"Have you been to the ER, urgent care clinic since your last visit? Hospitalized since your last visit? \"  8/14 [facility] hand surgery     2. \"Have you seen or consulted any other health care providers outside of the [address] [place] [address] since your last visit? \" No     3. For patients aged 43-73: Has the patient had a colonoscopy / FIT/ Cologuard? No      If the patient is female:    4. For patients aged 43-66: Has the patient had a mammogram within the past 2 years? No      5. For patients aged 21-65: Has the patient had a pap smear?  No

## 2023-08-24 RX ORDER — LINACLOTIDE 145 UG/1
CAPSULE, GELATIN COATED ORAL
Qty: 90 CAPSULE | Refills: 1 | Status: SHIPPED | OUTPATIENT
Start: 2023-08-24 | End: 2023-10-16

## 2023-08-25 RX ORDER — FLUTICASONE PROPIONATE 50 MCG
SPRAY, SUSPENSION (ML) NASAL
Qty: 16 G | Refills: 0 | Status: SHIPPED | OUTPATIENT
Start: 2023-08-25

## 2023-08-25 RX ORDER — ALBUTEROL SULFATE 90 UG/1
AEROSOL, METERED RESPIRATORY (INHALATION)
Qty: 18 G | Refills: 0 | Status: SHIPPED | OUTPATIENT
Start: 2023-08-25

## 2023-08-28 RX ORDER — LORATADINE 10 MG/1
TABLET ORAL
Qty: 90 TABLET | Refills: 0 | Status: SHIPPED | OUTPATIENT
Start: 2023-08-28

## 2023-09-12 ENCOUNTER — TELEPHONE (OUTPATIENT)
Age: 63
End: 2023-09-12

## 2023-09-12 NOTE — TELEPHONE ENCOUNTER
(Key: [id number])  Linzess 145MCG capsules,received. Darrin called Aashish Khan stated no PA needed,script was processed picked up on 8/24/23 went thru for 90 day supply.

## 2023-10-16 RX ORDER — LINACLOTIDE 145 UG/1
CAPSULE, GELATIN COATED ORAL
Qty: 30 CAPSULE | Refills: 0 | Status: SHIPPED | OUTPATIENT
Start: 2023-10-16

## 2023-10-16 RX ORDER — LEVOTHYROXINE SODIUM 0.03 MG/1
TABLET ORAL
Qty: 90 TABLET | Refills: 0 | Status: SHIPPED | OUTPATIENT
Start: 2023-10-16

## 2023-10-16 RX ORDER — AMITRIPTYLINE HYDROCHLORIDE 50 MG/1
TABLET, FILM COATED ORAL NIGHTLY
Qty: 60 TABLET | Refills: 0 | Status: SHIPPED | OUTPATIENT
Start: 2023-10-16

## 2023-10-16 RX ORDER — MONTELUKAST SODIUM 10 MG/1
TABLET ORAL
Qty: 90 TABLET | Refills: 0 | Status: SHIPPED | OUTPATIENT
Start: 2023-10-16

## 2023-10-16 RX ORDER — ALBUTEROL SULFATE 90 UG/1
AEROSOL, METERED RESPIRATORY (INHALATION)
Qty: 18 G | Refills: 0 | Status: SHIPPED | OUTPATIENT
Start: 2023-10-16

## 2023-11-06 RX ORDER — AMLODIPINE BESYLATE 2.5 MG/1
TABLET ORAL
Qty: 90 TABLET | Refills: 0 | Status: SHIPPED | OUTPATIENT
Start: 2023-11-06

## 2023-11-09 ENCOUNTER — TELEPHONE (OUTPATIENT)
Age: 63
End: 2023-11-09

## 2023-11-09 NOTE — TELEPHONE ENCOUNTER
[employer] medicaid called @ [phone]. PA completed with Customer care rep. Devonte GRAMAJO,for Linzess 145 mcg. Turn around time for out come,may be up to 24 hours. May send supporting documents with Ref # [id number]. Fax # is [phone]. Please associate diagnosis ICD code if possible. Follow up as needed. Thanks

## 2024-01-12 RX ORDER — AMLODIPINE BESYLATE 2.5 MG/1
TABLET ORAL
Qty: 90 TABLET | Refills: 3 | Status: SHIPPED | OUTPATIENT
Start: 2024-01-12

## 2024-01-12 RX ORDER — LORATADINE 10 MG/1
TABLET ORAL
Qty: 90 TABLET | Refills: 3 | Status: SHIPPED | OUTPATIENT
Start: 2024-01-12

## 2024-02-04 RX ORDER — LISINOPRIL 40 MG/1
40 TABLET ORAL DAILY
Qty: 90 TABLET | Refills: 0 | Status: SHIPPED | OUTPATIENT
Start: 2024-02-04

## 2024-02-07 ENCOUNTER — OFFICE VISIT (OUTPATIENT)
Age: 64
End: 2024-02-07
Payer: MEDICARE

## 2024-02-07 VITALS
DIASTOLIC BLOOD PRESSURE: 82 MMHG | RESPIRATION RATE: 16 BRPM | BODY MASS INDEX: 26.26 KG/M2 | WEIGHT: 148.2 LBS | HEIGHT: 63 IN | OXYGEN SATURATION: 98 % | SYSTOLIC BLOOD PRESSURE: 131 MMHG | HEART RATE: 70 BPM

## 2024-02-07 DIAGNOSIS — M25.562 CHRONIC PAIN OF LEFT KNEE: ICD-10-CM

## 2024-02-07 DIAGNOSIS — Z23 NEEDS FLU SHOT: ICD-10-CM

## 2024-02-07 DIAGNOSIS — Z78.0 POSTMENOPAUSAL: ICD-10-CM

## 2024-02-07 DIAGNOSIS — F33.0 MAJOR DEPRESSIVE DISORDER, RECURRENT, MILD (HCC): ICD-10-CM

## 2024-02-07 DIAGNOSIS — R73.9 HYPERGLYCEMIA, UNSPECIFIED: ICD-10-CM

## 2024-02-07 DIAGNOSIS — Z12.11 SCREEN FOR COLON CANCER: ICD-10-CM

## 2024-02-07 DIAGNOSIS — E55.9 VITAMIN D DEFICIENCY, UNSPECIFIED: ICD-10-CM

## 2024-02-07 DIAGNOSIS — G89.29 CHRONIC PAIN OF LEFT KNEE: ICD-10-CM

## 2024-02-07 DIAGNOSIS — E03.8 OTHER SPECIFIED HYPOTHYROIDISM: ICD-10-CM

## 2024-02-07 DIAGNOSIS — I10 ESSENTIAL HYPERTENSION: ICD-10-CM

## 2024-02-07 DIAGNOSIS — Z00.00 WELCOME TO MEDICARE PREVENTIVE VISIT: Primary | ICD-10-CM

## 2024-02-07 PROCEDURE — 99214 OFFICE O/P EST MOD 30 MIN: CPT | Performed by: FAMILY MEDICINE

## 2024-02-07 PROCEDURE — 90677 PCV20 VACCINE IM: CPT | Performed by: FAMILY MEDICINE

## 2024-02-07 PROCEDURE — 93005 ELECTROCARDIOGRAM TRACING: CPT | Performed by: FAMILY MEDICINE

## 2024-02-07 PROCEDURE — 90694 VACC AIIV4 NO PRSRV 0.5ML IM: CPT | Performed by: FAMILY MEDICINE

## 2024-02-07 RX ORDER — MONTELUKAST SODIUM 10 MG/1
10 TABLET ORAL DAILY
Qty: 90 TABLET | Refills: 3 | Status: SHIPPED | OUTPATIENT
Start: 2024-02-07

## 2024-02-07 RX ORDER — LEVOTHYROXINE SODIUM 0.03 MG/1
25 TABLET ORAL
Qty: 90 TABLET | Refills: 3 | Status: SHIPPED | OUTPATIENT
Start: 2024-02-07

## 2024-02-07 RX ORDER — LISINOPRIL 40 MG/1
40 TABLET ORAL DAILY
Qty: 90 TABLET | Refills: 3 | Status: SHIPPED | OUTPATIENT
Start: 2024-02-07

## 2024-02-07 RX ORDER — AMLODIPINE BESYLATE 2.5 MG/1
2.5 TABLET ORAL DAILY
Qty: 90 TABLET | Refills: 3 | Status: SHIPPED | OUTPATIENT
Start: 2024-02-07

## 2024-02-07 RX ORDER — AMITRIPTYLINE HYDROCHLORIDE 50 MG/1
50 TABLET, FILM COATED ORAL NIGHTLY
Qty: 90 TABLET | Refills: 3 | Status: SHIPPED | OUTPATIENT
Start: 2024-02-07

## 2024-02-07 ASSESSMENT — LIFESTYLE VARIABLES
HOW OFTEN DO YOU HAVE A DRINK CONTAINING ALCOHOL: NEVER
HOW MANY STANDARD DRINKS CONTAINING ALCOHOL DO YOU HAVE ON A TYPICAL DAY: PATIENT DOES NOT DRINK

## 2024-02-07 ASSESSMENT — PATIENT HEALTH QUESTIONNAIRE - PHQ9
SUM OF ALL RESPONSES TO PHQ QUESTIONS 1-9: 0
SUM OF ALL RESPONSES TO PHQ9 QUESTIONS 1 & 2: 0
2. FEELING DOWN, DEPRESSED OR HOPELESS: 0
SUM OF ALL RESPONSES TO PHQ QUESTIONS 1-9: 0
1. LITTLE INTEREST OR PLEASURE IN DOING THINGS: 0

## 2024-02-07 NOTE — PATIENT INSTRUCTIONS
Learning About Being Active as an Older Adult  Why is being active important as you get older?     Being active is one of the best things you can do for your health. And it's never too late to start. Being active--or getting active, if you aren't already--has definite benefits. It can:  Give you more energy,  Keep your mind sharp.  Improve balance to reduce your risk of falls.  Help you manage chronic illness with fewer medicines.  No matter how old you are, how fit you are, or what health problems you have, there is a form of activity that will work for you. And the more physical activity you can do, the better your overall health will be.  What kinds of activity can help you stay healthy?  Being more active will make your daily activities easier. Physical activity includes planned exercise and things you do in daily life. There are four types of activity:  Aerobic.  Doing aerobic activity makes your heart and lungs strong.  Includes walking, dancing, and gardening.  Aim for at least 2½ hours spread throughout the week.  It improves your energy and can help you sleep better.  Muscle-strengthening.  This type of activity can help maintain muscle and strengthen bones.  Includes climbing stairs, using resistance bands, and lifting or carrying heavy loads.  Aim for at least twice a week.  It can help protect the knees and other joints.  Stretching.  Stretching gives you better range of motion in joints and muscles.  Includes upper arm stretches, calf stretches, and gentle yoga.  Aim for at least twice a week, preferably after your muscles are warmed up from other activities.  It can help you function better in daily life.  Balancing.  This helps you stay coordinated and have good posture.  Includes heel-to-toe walking, teresa chi, and certain types of yoga.  Aim for at least 3 days a week.  It can reduce your risk of falling.  Even if you have a hard time meeting the recommendations, it's better to be more active

## 2024-02-07 NOTE — PROGRESS NOTES
Chief Complaint   Patient presents with    Medicare AWV     Pt reports that she has not been able to follow up with orthopedic provider due to family stresses.     Pt is requesting a handicap placard due to knee pain, ortega snot use any assistance devices.     Pt reports that her daughter and grandchild renaldo have moved in with her due to her daughter's lizz issues.     Medicare Annual Wellness Visit    Yani Lopez is here for Medicare AWV    Assessment & Plan   Welcome to Medicare preventive visit  -     AMB POC EKG ROUTINE  Essential hypertension  -     CBC with Auto Differential; Future  -     Lipid Panel; Future  -     Vitamin D 25 Hydroxy; Future  -     lisinopril (PRINIVIL;ZESTRIL) 40 MG tablet; Take 1 tablet by mouth daily, Disp-90 tablet, R-3Normal  -     amLODIPine (NORVASC) 2.5 MG tablet; Take 1 tablet by mouth daily, Disp-90 tablet, R-3Normal  Other specified hypothyroidism  -     TSH; Future  -     levothyroxine (SYNTHROID) 25 MCG tablet; Take 1 tablet by mouth every morning (before breakfast), Disp-90 tablet, R-3Normal  Hyperglycemia, unspecified  -     Comprehensive Metabolic Panel; Future  -     Hemoglobin A1C; Future  Major depressive disorder, recurrent, mild  Vitamin D deficiency, unspecified  -     Vitamin D 25 Hydroxy; Future  Screen for colon cancer  -     [facility] - Evan Varela MD, Gastroenterology, [place]  Needs flu shot  -     Influenza, FLUAD, (age 65 y+), IM, Preservative Free, 0.5 mL  Postmenopausal  -     DEXA BONE DENSITY AXIAL SKELETON; Future  Chronic pain of left knee  -     [facility] - Cezar Mijares MD, Orthopedic Surgery (knee), [place] Medicare annual wellness visit, subsequent    Recommendations for Preventive Services Due: see orders and patient instructions/AVS.  Recommended screening schedule for the next 5-10 years is provided to the patient in written form: see Patient Instructions/AVS.     No follow-ups on file.     Subjective   The following acute and/or chronic

## 2024-02-20 NOTE — TELEPHONE ENCOUNTER
Refill request (pt calling)    Pt is out    Pt is request a 90 day supply    LINZESS 145 MCG capsule     [facility] in [place]

## 2024-03-12 ENCOUNTER — NURSE ONLY (OUTPATIENT)
Age: 64
End: 2024-03-12

## 2024-03-12 DIAGNOSIS — E03.8 OTHER SPECIFIED HYPOTHYROIDISM: ICD-10-CM

## 2024-03-12 DIAGNOSIS — R73.9 HYPERGLYCEMIA, UNSPECIFIED: ICD-10-CM

## 2024-03-12 DIAGNOSIS — E55.9 VITAMIN D DEFICIENCY, UNSPECIFIED: ICD-10-CM

## 2024-03-12 DIAGNOSIS — I10 ESSENTIAL HYPERTENSION: ICD-10-CM

## 2024-03-13 LAB
25(OH)D3 SERPL-MCNC: 39.6 NG/ML (ref 30–100)
ALBUMIN SERPL-MCNC: 3.7 G/DL (ref 3.5–5)
ALBUMIN/GLOB SERPL: 1.2 (ref 1.1–2.2)
ALP SERPL-CCNC: 86 U/L (ref 45–117)
ALT SERPL-CCNC: 24 U/L (ref 12–78)
ANION GAP SERPL CALC-SCNC: 5 MMOL/L (ref 5–15)
AST SERPL-CCNC: 17 U/L (ref 15–37)
BASOPHILS # BLD: 0.1 K/UL (ref 0–0.1)
BASOPHILS NFR BLD: 2 % (ref 0–1)
BILIRUB SERPL-MCNC: 0.4 MG/DL (ref 0.2–1)
BUN SERPL-MCNC: 14 MG/DL (ref 6–20)
BUN/CREAT SERPL: 19 (ref 12–20)
CALCIUM SERPL-MCNC: 9.2 MG/DL (ref 8.5–10.1)
CHLORIDE SERPL-SCNC: 106 MMOL/L (ref 97–108)
CHOLEST SERPL-MCNC: 131 MG/DL
CO2 SERPL-SCNC: 27 MMOL/L (ref 21–32)
CREAT SERPL-MCNC: 0.75 MG/DL (ref 0.55–1.02)
DIFFERENTIAL METHOD BLD: ABNORMAL
EOSINOPHIL # BLD: 0.2 K/UL (ref 0–0.4)
EOSINOPHIL NFR BLD: 4 % (ref 0–7)
ERYTHROCYTE [DISTWIDTH] IN BLOOD BY AUTOMATED COUNT: 13.7 % (ref 11.5–14.5)
EST. AVERAGE GLUCOSE BLD GHB EST-MCNC: 108 MG/DL
GLOBULIN SER CALC-MCNC: 3 G/DL (ref 2–4)
GLUCOSE SERPL-MCNC: 103 MG/DL (ref 65–100)
HBA1C MFR BLD: 5.4 % (ref 4–5.6)
HCT VFR BLD AUTO: 39 % (ref 35–47)
HDLC SERPL-MCNC: 73 MG/DL
HDLC SERPL: 1.8 (ref 0–5)
HGB BLD-MCNC: 11.9 G/DL (ref 11.5–16)
IMM GRANULOCYTES # BLD AUTO: 0 K/UL (ref 0–0.04)
IMM GRANULOCYTES NFR BLD AUTO: 0 % (ref 0–0.5)
LDLC SERPL CALC-MCNC: 49.8 MG/DL (ref 0–100)
LYMPHOCYTES # BLD: 1.1 K/UL (ref 0.8–3.5)
LYMPHOCYTES NFR BLD: 25 % (ref 12–49)
MCH RBC QN AUTO: 27 PG (ref 26–34)
MCHC RBC AUTO-ENTMCNC: 30.5 G/DL (ref 30–36.5)
MCV RBC AUTO: 88.4 FL (ref 80–99)
MONOCYTES # BLD: 0.3 K/UL (ref 0–1)
MONOCYTES NFR BLD: 7 % (ref 5–13)
NEUTS SEG # BLD: 2.7 K/UL (ref 1.8–8)
NEUTS SEG NFR BLD: 62 % (ref 32–75)
NRBC # BLD: 0 K/UL (ref 0–0.01)
NRBC BLD-RTO: 0 PER 100 WBC
PLATELET # BLD AUTO: 240 K/UL (ref 150–400)
POTASSIUM SERPL-SCNC: 4 MMOL/L (ref 3.5–5.1)
PROT SERPL-MCNC: 6.7 G/DL (ref 6.4–8.2)
RBC # BLD AUTO: 4.41 M/UL (ref 3.8–5.2)
RBC MORPH BLD: ABNORMAL
SODIUM SERPL-SCNC: 138 MMOL/L (ref 136–145)
TRIGL SERPL-MCNC: 41 MG/DL
TSH SERPL DL<=0.05 MIU/L-ACNC: 1.79 UIU/ML (ref 0.36–3.74)
VLDLC SERPL CALC-MCNC: 8.2 MG/DL
WBC # BLD AUTO: 4.4 K/UL (ref 3.6–11)

## 2024-03-22 RX ORDER — AMITRIPTYLINE HYDROCHLORIDE 50 MG/1
100 TABLET, FILM COATED ORAL NIGHTLY
Qty: 180 TABLET | Refills: 1 | Status: SHIPPED | OUTPATIENT
Start: 2024-03-22

## 2024-03-22 RX ORDER — FLUTICASONE PROPIONATE 50 MCG
SPRAY, SUSPENSION (ML) NASAL
Qty: 16 G | Refills: 0 | Status: SHIPPED | OUTPATIENT
Start: 2024-03-22

## 2024-03-23 RX ORDER — ALBUTEROL SULFATE 90 UG/1
AEROSOL, METERED RESPIRATORY (INHALATION)
Qty: 18 G | Refills: 0 | Status: SHIPPED | OUTPATIENT
Start: 2024-03-23

## 2024-05-01 RX ORDER — FLUTICASONE PROPIONATE 50 MCG
SPRAY, SUSPENSION (ML) NASAL
Qty: 16 G | Refills: 0 | Status: SHIPPED | OUTPATIENT
Start: 2024-05-01

## 2024-05-01 RX ORDER — ALBUTEROL SULFATE 90 UG/1
AEROSOL, METERED RESPIRATORY (INHALATION)
Qty: 18 G | Refills: 0 | Status: SHIPPED | OUTPATIENT
Start: 2024-05-01

## 2024-07-18 RX ORDER — FLUTICASONE PROPIONATE 50 MCG
SPRAY, SUSPENSION (ML) NASAL
Qty: 16 G | Refills: 0 | Status: SHIPPED | OUTPATIENT
Start: 2024-07-18

## 2024-07-18 RX ORDER — ALBUTEROL SULFATE 90 UG/1
AEROSOL, METERED RESPIRATORY (INHALATION)
Qty: 18 G | Refills: 0 | Status: SHIPPED | OUTPATIENT
Start: 2024-07-18

## 2024-07-18 RX ORDER — LINACLOTIDE 145 UG/1
CAPSULE, GELATIN COATED ORAL
Qty: 30 CAPSULE | Refills: 0 | Status: SHIPPED | OUTPATIENT
Start: 2024-07-18

## (undated) DEVICE — TOWEL SURG W17XL27IN STD BLU COT NONFENESTRATED PREWASHED

## (undated) DEVICE — GLOVE SURG SZ 7 L12IN FNGR THK79MIL GRN LTX FREE

## (undated) DEVICE — GLOVE ORANGE PI 7   MSG9070

## (undated) DEVICE — SOLUTION IRRIG 1000ML 0.9% SOD CHL USP POUR PLAS BTL

## (undated) DEVICE — SUTURE ETHLN SZ 3-0 L18IN NONABSORBABLE BLK PS-2 L19MM 3/8 1669H

## (undated) DEVICE — CORD ES L12FT BPLR FRCP

## (undated) DEVICE — HAND-MRMCASU: Brand: MEDLINE INDUSTRIES, INC.

## (undated) DEVICE — WRAP COHESIVE W2INXL5YD TAN SELF ADH BNDG HND NON STERILE TEAR CARING

## (undated) DEVICE — ZIMMER® STERILE DISPOSABLE TOURNIQUET CUFF WITH PLC, DUAL PORT, SINGLE BLADDER, 18 IN. (46 CM)

## (undated) DEVICE — GLOVE SURG SZ 65 THK91MIL LTX FREE SYN POLYISOPRENE

## (undated) DEVICE — GOWN,SIRUS,NONRNF,SETINSLV,XL,20/CS: Brand: MEDLINE